# Patient Record
Sex: FEMALE | Race: WHITE | Employment: UNEMPLOYED | URBAN - METROPOLITAN AREA
[De-identification: names, ages, dates, MRNs, and addresses within clinical notes are randomized per-mention and may not be internally consistent; named-entity substitution may affect disease eponyms.]

---

## 2017-09-12 PROBLEM — Z85.3 PERSONAL HISTORY OF BREAST CANCER: Status: ACTIVE | Noted: 2017-09-12

## 2018-05-17 DIAGNOSIS — Z12.31 VISIT FOR SCREENING MAMMOGRAM: Primary | ICD-10-CM

## 2018-06-15 ASSESSMENT — ENCOUNTER SYMPTOMS
WHEEZING: 0
DIARRHEA: 0
EYE ITCHING: 0
VOMITING: 0
TROUBLE SWALLOWING: 0
SHORTNESS OF BREATH: 0
CONSTIPATION: 0
ROS SKIN COMMENTS: DENIES BREAST SKIN CHANGES, ARM SWELLING, OR PALPABLE AXILLARY OR SUPRACLAVICULAR ADENOPATHY.
BLOOD IN STOOL: 0
SORE THROAT: 0
SINUS PRESSURE: 0
ABDOMINAL PAIN: 0
EYE DISCHARGE: 0
SINUS PAIN: 0
RHINORRHEA: 0
COLOR CHANGE: 0
COUGH: 0
BACK PAIN: 0
CHEST TIGHTNESS: 0
VOICE CHANGE: 0
CHOKING: 0
ABDOMINAL DISTENTION: 0
NAUSEA: 0

## 2018-06-21 ENCOUNTER — HOSPITAL ENCOUNTER (OUTPATIENT)
Dept: GENERAL RADIOLOGY | Age: 68
Discharge: HOME | End: 2018-06-23
Payer: MEDICARE

## 2018-06-21 ENCOUNTER — TELEPHONE (OUTPATIENT)
Dept: BREAST CENTER | Age: 68
End: 2018-06-21

## 2018-06-21 ENCOUNTER — OFFICE VISIT (OUTPATIENT)
Dept: BREAST CENTER | Age: 68
End: 2018-06-21
Payer: MEDICARE

## 2018-06-21 VITALS
HEIGHT: 64 IN | RESPIRATION RATE: 16 BRPM | DIASTOLIC BLOOD PRESSURE: 64 MMHG | OXYGEN SATURATION: 99 % | WEIGHT: 116.3 LBS | BODY MASS INDEX: 19.85 KG/M2 | HEART RATE: 62 BPM | SYSTOLIC BLOOD PRESSURE: 110 MMHG | TEMPERATURE: 97.8 F

## 2018-06-21 DIAGNOSIS — Z12.31 VISIT FOR SCREENING MAMMOGRAM: ICD-10-CM

## 2018-06-21 DIAGNOSIS — Z85.3 PERSONAL HISTORY OF BREAST CANCER: ICD-10-CM

## 2018-06-21 DIAGNOSIS — Z12.31 ENCOUNTER FOR SCREENING MAMMOGRAM FOR MALIGNANT NEOPLASM OF BREAST: ICD-10-CM

## 2018-06-21 PROCEDURE — G8400 PT W/DXA NO RESULTS DOC: HCPCS | Performed by: NURSE PRACTITIONER

## 2018-06-21 PROCEDURE — 4004F PT TOBACCO SCREEN RCVD TLK: CPT | Performed by: NURSE PRACTITIONER

## 2018-06-21 PROCEDURE — 99213 OFFICE O/P EST LOW 20 MIN: CPT | Performed by: NURSE PRACTITIONER

## 2018-06-21 PROCEDURE — 4040F PNEUMOC VAC/ADMIN/RCVD: CPT | Performed by: NURSE PRACTITIONER

## 2018-06-21 PROCEDURE — 1090F PRES/ABSN URINE INCON ASSESS: CPT | Performed by: NURSE PRACTITIONER

## 2018-06-21 PROCEDURE — 77067 SCR MAMMO BI INCL CAD: CPT

## 2018-06-21 PROCEDURE — 3017F COLORECTAL CA SCREEN DOC REV: CPT | Performed by: NURSE PRACTITIONER

## 2018-06-21 PROCEDURE — 1123F ACP DISCUSS/DSCN MKR DOCD: CPT | Performed by: NURSE PRACTITIONER

## 2018-06-21 PROCEDURE — G8420 CALC BMI NORM PARAMETERS: HCPCS | Performed by: NURSE PRACTITIONER

## 2018-06-21 PROCEDURE — G8427 DOCREV CUR MEDS BY ELIG CLIN: HCPCS | Performed by: NURSE PRACTITIONER

## 2018-06-21 PROCEDURE — 3014F SCREEN MAMMO DOC REV: CPT | Performed by: NURSE PRACTITIONER

## 2019-06-14 ASSESSMENT — ENCOUNTER SYMPTOMS
SINUS PRESSURE: 0
RHINORRHEA: 0
BACK PAIN: 0
SINUS PAIN: 0
CONSTIPATION: 0
ABDOMINAL PAIN: 0
TROUBLE SWALLOWING: 0
CHOKING: 0
NAUSEA: 0
SHORTNESS OF BREATH: 0
BLOOD IN STOOL: 0
VOMITING: 0
VOICE CHANGE: 0
CHEST TIGHTNESS: 0
EYE ITCHING: 0
ABDOMINAL DISTENTION: 0
WHEEZING: 0
COLOR CHANGE: 0
COUGH: 0
DIARRHEA: 0
SORE THROAT: 0
EYE DISCHARGE: 0
ROS SKIN COMMENTS: DENIES BREAST SKIN CHANGES, ARM SWELLING, OR PALPABLE AXILLARY OR SUPRACLAVICULAR ADENOPATHY.

## 2019-06-14 NOTE — PROGRESS NOTES
Subjective:  Hx of left modified radical mastectomy for stage 0 or at most stage I breast cancer at age 30. No adjuvant therapy, (Endocrine, medical, or radiation). Patient ID: Merna Helton is a 81 y.o. female. HPI    History and Physical    Patient's Name/Date of Birth: Merna Helton / 1950    Merna Helton for follow-up in light of her personal history of breast cancer. PCP:  Ren Jay Gynecologist: Herson Jeong     The patient has not noted any right breast masses or changes in her left mastectomy site. Patient does not routinely do self breast exams. Patient denies nipple discharge. Patient has a history of left breast cancer, see notes below. Patient states she did not follow up with a medical oncologist due to circumstances. Breast cancer risk factors include age, gender, family history of breast cancer, previous breast CA. Age of menarche was 17. Age of menopause was 41. Patient denies hormonal therapy. Patient is . A    Estimated body mass index is 20.23 kg/(m^2) as calculated from the following:    Height as of this encounter: 5' 4.5\" (1.638 m). Weight as of this encounter: 119 lb 11.2 oz (54.3 kg). Bra Size: 32A-B    Patient drinks moderate caffeinated beverages. She does smoke cigarettes. History of left breast cancer:              Surgical pathology, 1988      Patient does not recall breast marker studies being done. 92: Right breast lump - Fine needle aspiration biopsy: scant cellular nondiagnostic aspirate insufficient for further evaluation  10/2/92: Excision of right breast mass ~ Pathology: Dense fibrosis and cystic dilation of ductal structures, some of which contain apocrine metaplasia. No evidence of atypia, hyperplasia, malignancy or other pathologic processes are identified. The findings are consistent with fibrocystic change.       2005:  Left implant removal due to discomfort:  Left axillary lymph node metoprolol succinate (TOPROL XL) 25 MG extended release tablet Take 25 mg by mouth daily       No current facility-administered medications for this visit. Allergies   Allergen Reactions    Celebrex [Celecoxib] Swelling       Family History   Problem Relation Age of Onset    Cancer Father 79        prostate    Heart Disease Father     Cancer Paternal Cousin 61        breast       Social History     Socioeconomic History    Marital status:      Spouse name: Not on file    Number of children: Not on file    Years of education: Not on file    Highest education level: Not on file   Occupational History    Not on file   Social Needs    Financial resource strain: Not on file    Food insecurity:     Worry: Not on file     Inability: Not on file    Transportation needs:     Medical: Not on file     Non-medical: Not on file   Tobacco Use    Smoking status: Light Tobacco Smoker     Packs/day: 0.25     Years: 40.00     Pack years: 10.00    Smokeless tobacco: Never Used    Tobacco comment: patient smokes a few cigarettes a day.   Prior heavy smoker of 1 PPD   Substance and Sexual Activity    Alcohol use: No    Drug use: No    Sexual activity: Not on file   Lifestyle    Physical activity:     Days per week: Not on file     Minutes per session: Not on file    Stress: Not on file   Relationships    Social connections:     Talks on phone: Not on file     Gets together: Not on file     Attends Taoist service: Not on file     Active member of club or organization: Not on file     Attends meetings of clubs or organizations: Not on file     Relationship status: Not on file    Intimate partner violence:     Fear of current or ex partner: Not on file     Emotionally abused: Not on file     Physically abused: Not on file     Forced sexual activity: Not on file   Other Topics Concern    Not on file   Social History Narrative    Not on file       Occupation: Works for [employer] Constitutional: Negative for activity change, appetite change, chills, fatigue, fever and unexpected weight change. HENT: Negative for congestion, postnasal drip, rhinorrhea, sinus pressure, sinus pain, sore throat, trouble swallowing and voice change. Eyes: Negative for discharge, itching and visual disturbance. Respiratory: Negative for cough, choking, chest tightness, shortness of breath and wheezing. Cardiovascular: Negative for chest pain, palpitations and leg swelling. Gastrointestinal: Negative for abdominal distention, abdominal pain, blood in stool, constipation, diarrhea, nausea and vomiting. Endocrine: Negative for cold intolerance and heat intolerance. Genitourinary: Negative for difficulty urinating, dysuria, frequency and hematuria. Musculoskeletal: Negative for arthralgias, back pain, gait problem, joint swelling, myalgias, neck pain and neck stiffness. Skin: Negative for color change, pallor and rash. Denies breast skin changes, arm swelling, or palpable axillary or supraclavicular adenopathy. Allergic/Immunologic: Negative for environmental allergies and food allergies. Neurological: Negative for dizziness, seizures, syncope, speech difficulty, weakness, light-headedness and headaches. Hematological: Negative for adenopathy. Does not bruise/bleed easily. Psychiatric/Behavioral: Negative for agitation, confusion and decreased concentration. The patient is not nervous/anxious. Denies issues with mood, cognition, anxiety, or depression. Patient denies previous history of DVT/PE. Objective:   Physical Exam   Constitutional: She is oriented to person, place, and time. She appears well-developed and well-nourished. No distress. ECOG 0   HENT:   Head: Normocephalic and atraumatic. Mouth/Throat: Oropharynx is clear and moist. No oropharyngeal exudate. Eyes: Pupils are equal, round, and reactive to light.  Conjunctivae and EOM are normal. Right eye exhibits no discharge. Left eye exhibits no discharge. No scleral icterus. Neck: Normal range of motion. Neck supple. No JVD present. No tracheal deviation present. No thyromegaly present. Cardiovascular: Normal rate, regular rhythm and normal heart sounds. Exam reveals no gallop and no friction rub. No murmur heard. Pulmonary/Chest: Effort normal and breath sounds normal. No stridor. No respiratory distress. She has no wheezes. She has no rales. She exhibits no mass, no tenderness, no bony tenderness, no laceration, no edema, no deformity, no swelling and no retraction. Right breast exhibits no inverted nipple, no mass, no nipple discharge, no skin change and no tenderness. Left breast exhibits no inverted nipple, no mass, no nipple discharge, no skin change and no tenderness. Breasts are asymmetrical.   Right breast with scattered benign feeling fibroglandular nodules. No suspicious lumps nodules or masses appreciated. No nipple discharge. No axillary adenopathy. Left chest wall scar intact. No nodules lumps or masses appreciated no axillary adenopathy. No evidence of recurrent disease         Abdominal: Soft. She exhibits no distension. There is no tenderness. There is no rebound and no guarding. Musculoskeletal: Normal range of motion. She exhibits no edema, tenderness or deformity. Right shoulder: Normal.        Left shoulder: Normal. She exhibits normal range of motion. Left elbow: She exhibits no swelling. Left wrist: She exhibits no swelling. Lymphadenopathy:     She has no cervical adenopathy. Right cervical: No superficial cervical, no deep cervical and no posterior cervical adenopathy present. Left cervical: No superficial cervical, no deep cervical and no posterior cervical adenopathy present. She has no axillary adenopathy. Right axillary: No pectoral and no lateral adenopathy present.         Left axillary: No pectoral and no lateral adenopathy present. Right: No supraclavicular adenopathy present. Left: No supraclavicular adenopathy present. Neurological: She is alert and oriented to person, place, and time. Coordination normal.   Skin: Skin is warm and dry. No rash noted. She is not diaphoretic. No erythema. No pallor. Psychiatric: She has a normal mood and affect. Her behavior is normal. Judgment and thought content normal.        Assessment:      81 y.o. woman who, with review of old records, it appears the patient had a left modified radical mastectomy for stage 0 or at most stage I breast cancer at age 30. Never had any adjuvant therapy, medical or radiation. Presented 9/12/2017 to establish care. Left breast cancer:  Post left modified radical mastectomy with axillary node dissection (24) and left breast mammoplasty on 11/18/1988:            Surgical pathology, 11/21/1988      Patient does not recall breast marker studies being done. 9/29/92: Right breast lump - Fine needle aspiration biopsy: scant cellular nondiagnostic aspirate insufficient for further evaluation  10/2/92: Excision of right breast mass ~ Pathology: Dense fibrosis and cystic dilation of ductal structures, some of which contain apocrine metaplasia. No evidence of atypia, hyperplasia, malignancy or other pathologic processes are identified. The findings are consistent with fibrocystic change.       1/19/2005:  Left implant removal due to discomfort:  Left axillary lymph node excision due to lump:          No adjuvant therapy, medical or radiation.     -Right screening mammogram today, 06/25/2019:    COMPARISON:   The present examination has been compared to prior imaging studies performed at [address] [place] [address] on 06/14/2017 and 06/21/2018.       CAD:   This exam was reviewed using the [employer] Computer Aided Detection (CAD)       TISSUE DENSITY:   The breast is extremely dense (Type 4 density).       MAMMOGRAM FINDINGS:   Finding 1:   No suspicious masses, areas of suspicious architectural distortion, suspicious calcifications, or additional suspicious findings are identified.  There are no significant changes from the prior study.       IMPRESSION:   No mammographic evidence of malignancy.       Screening mammogram in 1 year is recommended. Clinically, there is no evidence of recurrence. Genetic testing: Underwent genetic testing at [facility] in [facility]. She reports no genetic mutations identified. We will call for report. Will plan on yearly mammograms and physical exams in the office. Lymphedema precautions, left arm, reviewed. Questions answered to patient satisfaction. Plan:      1. Continue monthly breast self examination. Bring any changes to your physician's attention. 2. Continue healthy diet and exercise routinely as tolerated. 3. Avoid alcohol or limit alcohol intake to < 1 drink per day. 4. Repeat mammogram 1 year/June 2020. .  5. Continue follow up with Primary Care. 6. Labs per PCP  7. RTC 1 year with MGM same day (not ordered).

## 2019-06-25 ENCOUNTER — HOSPITAL ENCOUNTER (OUTPATIENT)
Dept: GENERAL RADIOLOGY | Age: 69
Discharge: HOME | End: 2019-06-27
Payer: MEDICARE

## 2019-06-25 ENCOUNTER — OFFICE VISIT (OUTPATIENT)
Dept: BREAST CENTER | Age: 69
End: 2019-06-25
Payer: MEDICARE

## 2019-06-25 VITALS
OXYGEN SATURATION: 98 % | SYSTOLIC BLOOD PRESSURE: 102 MMHG | RESPIRATION RATE: 16 BRPM | TEMPERATURE: 97.8 F | BODY MASS INDEX: 20.01 KG/M2 | WEIGHT: 117.2 LBS | HEIGHT: 64 IN | HEART RATE: 56 BPM | DIASTOLIC BLOOD PRESSURE: 68 MMHG

## 2019-06-25 DIAGNOSIS — Z12.31 ENCOUNTER FOR SCREENING MAMMOGRAM FOR MALIGNANT NEOPLASM OF BREAST: ICD-10-CM

## 2019-06-25 DIAGNOSIS — Z85.3 PERSONAL HISTORY OF BREAST CANCER: ICD-10-CM

## 2019-06-25 DIAGNOSIS — Z85.3 PERSONAL HISTORY OF BREAST CANCER: Primary | ICD-10-CM

## 2019-06-25 PROCEDURE — 4040F PNEUMOC VAC/ADMIN/RCVD: CPT | Performed by: SURGERY

## 2019-06-25 PROCEDURE — G8420 CALC BMI NORM PARAMETERS: HCPCS | Performed by: SURGERY

## 2019-06-25 PROCEDURE — 4004F PT TOBACCO SCREEN RCVD TLK: CPT | Performed by: SURGERY

## 2019-06-25 PROCEDURE — 77063 BREAST TOMOSYNTHESIS BI: CPT

## 2019-06-25 PROCEDURE — G8400 PT W/DXA NO RESULTS DOC: HCPCS | Performed by: SURGERY

## 2019-06-25 PROCEDURE — 3017F COLORECTAL CA SCREEN DOC REV: CPT | Performed by: SURGERY

## 2019-06-25 PROCEDURE — 99214 OFFICE O/P EST MOD 30 MIN: CPT | Performed by: SURGERY

## 2019-06-25 PROCEDURE — 1123F ACP DISCUSS/DSCN MKR DOCD: CPT | Performed by: SURGERY

## 2019-06-25 PROCEDURE — 99213 OFFICE O/P EST LOW 20 MIN: CPT | Performed by: SURGERY

## 2019-06-25 PROCEDURE — 1090F PRES/ABSN URINE INCON ASSESS: CPT | Performed by: SURGERY

## 2019-06-25 PROCEDURE — G8427 DOCREV CUR MEDS BY ELIG CLIN: HCPCS | Performed by: SURGERY

## 2019-06-25 RX ORDER — PHENOL 1.4 %
1 AEROSOL, SPRAY (ML) MUCOUS MEMBRANE DAILY
COMMUNITY

## 2019-06-25 NOTE — COMMUNICATION BODY
Assessment:     81 y.o. woman who, with review of old records, it appears the patient had a left modified radical mastectomy for stage 0 or at most stage I breast cancer at age 30. Never had any adjuvant therapy, medical or radiation. Presented 9/12/2017 to establish care. Left breast cancer:  Post left modified radical mastectomy with axillary node dissection (24) and left breast mammoplasty on 11/18/1988:            Surgical pathology, 11/21/1988      Patient does not recall breast marker studies being done. 9/29/92: Right breast lump - Fine needle aspiration biopsy: scant cellular nondiagnostic aspirate insufficient for further evaluation  10/2/92: Excision of right breast mass ~ Pathology: Dense fibrosis and cystic dilation of ductal structures, some of which contain apocrine metaplasia. No evidence of atypia, hyperplasia, malignancy or other pathologic processes are identified. The findings are consistent with fibrocystic change. 1/19/2005:  Left implant removal due to discomfort:  Left axillary lymph node excision due to lump:          No adjuvant therapy, medical or radiation.     -Right screening mammogram today, 06/25/2019:    COMPARISON:   The present examination has been compared to prior imaging studies performed at [address] [place] [address] on 06/14/2017 and 06/21/2018.       CAD:   This exam was reviewed using the [employer] Computer Aided Detection (CAD)       TISSUE DENSITY:   The breast is extremely dense (Type 4 density).       MAMMOGRAM FINDINGS:   Finding 1:   No suspicious masses, areas of suspicious architectural distortion, suspicious calcifications, or additional suspicious findings are identified.  There are no significant changes from the prior study.       IMPRESSION:   No mammographic evidence of malignancy.       Screening mammogram in 1 year is recommended. Clinically, there is no evidence of recurrence.     Genetic testing: Underwent genetic testing at [facility] in [facility]. She reports no genetic mutations identified. We will call for report. Will plan on yearly mammograms and physical exams in the office. Lymphedema precautions, left arm, reviewed. Questions answered to patient satisfaction. Plan:     1. Continue monthly breast self examination. Bring any changes to your physician's attention. 2. Continue healthy diet and exercise routinely as tolerated. 3. Avoid alcohol or limit alcohol intake to < 1 drink per day. 4. Repeat mammogram 1 year/June 2020. .  5. Continue follow up with Primary Care. 6. Labs per PCP  7. RTC 1 year with MGM same day (not ordered).

## 2019-06-25 NOTE — LETTER
[facility]  [place] [address]  Phone: [phone]  Fax: [phone]    Adeline Officer, MD        June 25, 2019     Star Martel MD  [address] [place] [address] [phone] [id number]    Patient: Jennifer Matias  MR Number: <[id number]>  YOB: 1950  Date of Visit: 6/25/2019    Dear Dr. Star Martel:     Jennifer Matias stopped in to see us today in follow-up of her left breast cancer. Below are the relevant portions of my assessment and plan of care. Assessment:     81 y.o. woman whounderwent a left modified radical mastectomy for stage 0 or at most stage I breast cancer at age 30. No adjuvant therapy, medical or radiation. Left breast cancer:  Post left modified radical mastectomy with axillary node dissection (24) and left breast mammoplasty on 11/18/1988:            Surgical pathology, 11/21/1988      Patient does not recall breast marker studies being done. 9/29/92: Right breast lump - Fine needle aspiration biopsy: scant cellular nondiagnostic aspirate insufficient for further evaluation  10/2/92: Excision of right breast mass ~ Pathology: Dense fibrosis and cystic dilation of ductal structures, some of which contain apocrine metaplasia. No evidence of atypia, hyperplasia, malignancy or other pathologic processes are identified. The findings are consistent with fibrocystic change.     1/19/2005:  Left implant removal due to discomfort:  Left axillary lymph node excision due to lump:          No adjuvant therapy, medical or radiation.     -Right screening mammogram today, 06/25/2019:    COMPARISON:   The present examination has been compared to prior imaging studies performed at [address] [place] [address] on 06/14/2017 and 06/21/2018.       CAD:   This exam was reviewed using the [employer] Computer Aided Detection (CAD)       TISSUE DENSITY:   The breast is extremely dense (Type 4 density).       MAMMOGRAM FINDINGS:

## 2019-06-25 NOTE — PATIENT INSTRUCTIONS
Patient is scheduled for mammogram 6/30/20 @ 9:00am Breast care center --- Follow up visit with Pete Nguyen CNP following imaging 6/30/20 @ 10:00am

## 2019-06-26 ENCOUNTER — TELEPHONE (OUTPATIENT)
Dept: BREAST CENTER | Age: 69
End: 2019-06-26

## 2019-06-26 DIAGNOSIS — Z12.31 SCREENING MAMMOGRAM, ENCOUNTER FOR: Primary | ICD-10-CM

## 2019-06-26 DIAGNOSIS — R92.8 ABNORMAL SCREENING MAMMOGRAM: ICD-10-CM

## 2019-06-26 NOTE — TELEPHONE ENCOUNTER
Spoke with staff at [facility]. Requested patient's genetics report. She was referred June 2018. Fax and phone number provided.

## 2020-06-25 ASSESSMENT — ENCOUNTER SYMPTOMS
SINUS PRESSURE: 0
ABDOMINAL DISTENTION: 0
CHOKING: 0
COUGH: 0
CHEST TIGHTNESS: 0
DIARRHEA: 0
RHINORRHEA: 0
ABDOMINAL PAIN: 0
BLOOD IN STOOL: 0
VOICE CHANGE: 0
EYE ITCHING: 0
SINUS PAIN: 0
COLOR CHANGE: 0
CONSTIPATION: 0
EYE DISCHARGE: 0
ROS SKIN COMMENTS: DENIES BREAST SKIN CHANGES, ARM SWELLING, OR PALPABLE AXILLARY OR SUPRACLAVICULAR ADENOPATHY.
SORE THROAT: 0
VOMITING: 0
TROUBLE SWALLOWING: 0
SHORTNESS OF BREATH: 0
NAUSEA: 0
BACK PAIN: 0
WHEEZING: 0

## 2020-06-25 NOTE — PROGRESS NOTES
Subjective:  Hx of left modified radical mastectomy for stage 0 or at most stage I breast cancer at age 30. No adjuvant therapy, (Endocrine, medical, or radiation). Some elements of all sections below were copied from my previous note were reviewed, re-examined and updated where appropriate. All elements reflect the medical decision making of today. Patient ID: Asiya Morrell is a 78 y.o. female. HPI  History and Physical    Patient's Name/Date of Birth: Asiya Morrell / 1950    Asiya Morrell for follow-up in light of her personal history of breast cancer. PCP:  Kadeem Mullins Gynecologist: Walt Almazan     78 y.o. pleasant female presents for follow up today and is without breast/chest wall related complaints. She does have bilateral shoulder discomfort and is currently in therapy. Patient denies nipple discharge. Patient has a history of left breast cancer, see notes below. Patient states she did not follow up with a medical oncologist due to circumstances. Breast cancer risk factors include age, gender, family history of breast cancer, previous breast CA. Age of menarche was 17. Age of menopause was 41. Patient denies hormonal therapy. Patient is . A    Estimated body mass index is 20.23 kg/(m^2) as calculated from the following:    Height as of this encounter: 5' 4.5\" (1.638 m). Weight as of this encounter: 119 lb 11.2 oz (54.3 kg). Bra Size: 32A-B    Patient drinks moderate caffeinated beverages. She does smoke cigarettes. History of left breast cancer:        Surgical pathology, 1988      Patient does not recall breast marker studies being done. 92: Right breast lump - Fine needle aspiration biopsy: scant cellular nondiagnostic aspirate insufficient for further evaluation    10/2/92: Excision of right breast mass ~ Pathology: Dense fibrosis and cystic dilation of ductal structures, some of which contain apocrine metaplasia.  No evidence of

## 2020-06-30 ENCOUNTER — HOSPITAL ENCOUNTER (OUTPATIENT)
Dept: GENERAL RADIOLOGY | Age: 70
Discharge: HOME | End: 2020-07-02
Payer: MEDICARE

## 2020-06-30 ENCOUNTER — OFFICE VISIT (OUTPATIENT)
Dept: BREAST CENTER | Age: 70
End: 2020-06-30
Payer: MEDICARE

## 2020-06-30 VITALS
RESPIRATION RATE: 18 BRPM | WEIGHT: 116 LBS | HEIGHT: 64 IN | HEART RATE: 54 BPM | BODY MASS INDEX: 19.81 KG/M2 | DIASTOLIC BLOOD PRESSURE: 64 MMHG | TEMPERATURE: 97.4 F | SYSTOLIC BLOOD PRESSURE: 112 MMHG | OXYGEN SATURATION: 97 %

## 2020-06-30 PROCEDURE — 4004F PT TOBACCO SCREEN RCVD TLK: CPT | Performed by: NURSE PRACTITIONER

## 2020-06-30 PROCEDURE — 99213 OFFICE O/P EST LOW 20 MIN: CPT | Performed by: NURSE PRACTITIONER

## 2020-06-30 PROCEDURE — 1090F PRES/ABSN URINE INCON ASSESS: CPT | Performed by: NURSE PRACTITIONER

## 2020-06-30 PROCEDURE — 3017F COLORECTAL CA SCREEN DOC REV: CPT | Performed by: NURSE PRACTITIONER

## 2020-06-30 PROCEDURE — G8420 CALC BMI NORM PARAMETERS: HCPCS | Performed by: NURSE PRACTITIONER

## 2020-06-30 PROCEDURE — 77063 BREAST TOMOSYNTHESIS BI: CPT

## 2020-06-30 PROCEDURE — G8400 PT W/DXA NO RESULTS DOC: HCPCS | Performed by: NURSE PRACTITIONER

## 2020-06-30 PROCEDURE — 4040F PNEUMOC VAC/ADMIN/RCVD: CPT | Performed by: NURSE PRACTITIONER

## 2020-06-30 PROCEDURE — G8427 DOCREV CUR MEDS BY ELIG CLIN: HCPCS | Performed by: NURSE PRACTITIONER

## 2020-06-30 PROCEDURE — 1123F ACP DISCUSS/DSCN MKR DOCD: CPT | Performed by: NURSE PRACTITIONER

## 2021-02-16 ENCOUNTER — OFFICE VISIT (OUTPATIENT)
Dept: ENT CLINIC | Age: 71
End: 2021-02-16
Payer: MEDICARE

## 2021-02-16 VITALS — WEIGHT: 119 LBS | HEIGHT: 64 IN | BODY MASS INDEX: 20.32 KG/M2

## 2021-02-16 DIAGNOSIS — J34.89 NASAL MUCOSA DRY: ICD-10-CM

## 2021-02-16 DIAGNOSIS — R04.0 EPISTAXIS: Primary | ICD-10-CM

## 2021-02-16 PROCEDURE — 1123F ACP DISCUSS/DSCN MKR DOCD: CPT | Performed by: OTOLARYNGOLOGY

## 2021-02-16 PROCEDURE — 4004F PT TOBACCO SCREEN RCVD TLK: CPT | Performed by: OTOLARYNGOLOGY

## 2021-02-16 PROCEDURE — 4040F PNEUMOC VAC/ADMIN/RCVD: CPT | Performed by: OTOLARYNGOLOGY

## 2021-02-16 PROCEDURE — G8484 FLU IMMUNIZE NO ADMIN: HCPCS | Performed by: OTOLARYNGOLOGY

## 2021-02-16 PROCEDURE — 99204 OFFICE O/P NEW MOD 45 MIN: CPT | Performed by: OTOLARYNGOLOGY

## 2021-02-16 PROCEDURE — 30901 CONTROL OF NOSEBLEED: CPT | Performed by: OTOLARYNGOLOGY

## 2021-02-16 PROCEDURE — G8400 PT W/DXA NO RESULTS DOC: HCPCS | Performed by: OTOLARYNGOLOGY

## 2021-02-16 PROCEDURE — 3017F COLORECTAL CA SCREEN DOC REV: CPT | Performed by: OTOLARYNGOLOGY

## 2021-02-16 PROCEDURE — G8420 CALC BMI NORM PARAMETERS: HCPCS | Performed by: OTOLARYNGOLOGY

## 2021-02-16 PROCEDURE — 1090F PRES/ABSN URINE INCON ASSESS: CPT | Performed by: OTOLARYNGOLOGY

## 2021-02-16 PROCEDURE — G8427 DOCREV CUR MEDS BY ELIG CLIN: HCPCS | Performed by: OTOLARYNGOLOGY

## 2021-02-16 RX ORDER — VITAMIN B COMPLEX
1 CAPSULE ORAL DAILY
COMMUNITY

## 2021-02-16 ASSESSMENT — ENCOUNTER SYMPTOMS
EYES NEGATIVE: 1
ALLERGIC/IMMUNOLOGIC NEGATIVE: 1
RESPIRATORY NEGATIVE: 1

## 2021-02-16 NOTE — PATIENT INSTRUCTIONS
Use nasal saline spray and AYR gel to nose few times a day      Patient Education        Nose Cautery for Nosebleeds: What to Expect at [address] [place] [address] cautery can help prevent nosebleeds. The doctor uses a chemical swab or an electric current to cauterize the inside of the nose. This seals the blood vessels and builds scar tissue to help prevent more bleeding. For this procedure, your doctor made the inside of your nose numb. After the procedure, you may feel itching and pain in your nose for 3 to 5 days. Over-the-counter pain medicines can help with pain. You may feel like you want to touch, scratch, or pick at the inside of your nose. But doing this may cause more nosebleeds. This care sheet gives you a general idea about how long it will take for you to recover. But each person recovers at a different pace. Follow the steps below to feel better as quickly as possible. How can you care for yourself at home? Nose care    · Don't touch the part of your nose that was treated.     · Try not to bump your nose.     · To avoid irritating your nose, do not blow your nose for 2 weeks. Gently wipe it one nostril at a time.     · If you get another nosebleed:  ? Sit up and tilt your head slightly forward. This keeps blood from going down your throat. ? Use your thumb and index finger to pinch your nose shut for 10 minutes. Use a clock. Do not check to see if the bleeding has stopped before the 10 minutes are up. If the bleeding has not stopped, pinch your nose shut for another 10 minutes.     · Apply antibacterial ointment or saline nasal spray to the inside of your nose several times a day for 10 days. This will help keep the area moist.   Activity    · For the first 2 to 3 hours after the procedure:  ? Don't bend over or lift anything heavy. ? Avoid heavy exercise or activity.     · You can do your normal activities when it feels okay to do so.    Medicines    · Your doctor will tell you if and when you can restart your medicines. He or she will also give you instructions about taking any new medicines.     · If you take aspirin or some other blood thinner, ask your doctor if and when to start taking it again. Make sure that you understand exactly what your doctor wants you to do.     · Be safe with medicines. Read and follow all instructions on the label. ? If the doctor gave you a prescription medicine for pain, take it as prescribed. ? If you are not taking a prescription pain medicine, ask your doctor if you can take an over-the-counter medicine. ? Avoid aspirin and NSAIDs like ibuprofen (Advil, Motrin) and naproxen (Aleve) while your nose is healing. They can increase the risk of bleeding. Follow-up care is a key part of your treatment and safety. Be sure to make and go to all appointments, and call your doctor if you are having problems. It's also a good idea to know your test results and keep a list of the medicines you take. When should you call for help? Call your doctor now or seek immediate medical care if:    · You have pain that does not get better after you take pain medicine.     · You get another nosebleed and your nose is still bleeding after you have pinched your nose shut 3 times for 10 minutes each time (30 minutes total).     · There is a lot of blood running down the back of your throat even after you pinch your nose and tilt your head forward.     · You have a fever. Watch closely for any changes in your health, and be sure to contact your doctor if:    · You still get nosebleeds often, even if they don't last long.     · You do not get better as expected. Where can you learn more? Go to https://Dynadmicmignon.[employer]. org and sign in to your [employer] account. Enter V667 in the [facility] box to learn more about \"Nose Cautery for Nosebleeds: What to Expect at Home. \"     If you do not have an account, please click on the \"Sign Up Now\" link.   Current as of: June 26, 4775               [employer] Version: 12.6  © 6191-3947 [employer]. Care instructions adapted under license by [facility] ([facility]). If you have questions about a medical condition or this instruction, always ask your healthcare professional. [address] any warranty or liability for your use of this information.

## 2021-02-16 NOTE — PROGRESS NOTES
Subjective:      Patient ID:  Sophia Atkinson is a 78 y.o. female. HPI:  Recurrent Epistaxis  The patient is a 78 y.o. female who presentes with epistaxis. The patient reports nosebleeds for a few months mainly on the right side, she reports only when she blows her nose she has bright red blood-tinged mucous. She has been wearing mask which she thinks she has been contributing to  The problem. She denies active anterior or posterior bleeding. The patient has never been treated for this before. Denies nasal congestion, nasal obstruction, fever, chills, weight changes. She has using Ocean spray intermittently. Chronic O2? no    There is not history of easy bruising or bleeding. Pt is not on anticoagulation therapy     Other epistaxis risk factors: dry air  Patient's medications, allergies, past medical,surgical, social and family histories were reviewed and updated as appropriate. Review of Systems   Constitutional: Negative. HENT: Positive for nosebleeds. Eyes: Negative. Respiratory: Negative. Allergic/Immunologic: Negative. Neurological: Negative. Hematological: Negative. Does not bruise/bleed easily. Psychiatric/Behavioral: Negative. All other systems reviewed and are negative. Objective: There were no vitals filed for this visit. Physical Exam  Vitals signs reviewed. Constitutional:       Appearance: Normal appearance. HENT:      Head: Normocephalic. Right Ear: Tympanic membrane, ear canal and external ear normal.      Left Ear: Tympanic membrane, ear canal and external ear normal.      Nose: Nose normal.      Comments: Dry nasal mucosa  Prominent vessels on the right anterior septum      Mouth/Throat:      Mouth: Mucous membranes are moist.      Pharynx: Oropharynx is clear. Uvula midline. Eyes:      Conjunctiva/sclera: Conjunctivae normal.   Neck:      Musculoskeletal: Normal range of motion and neck supple.    Cardiovascular:      Rate and Rhythm: Normal rate. Pulses: Normal pulses. Pulmonary:      Effort: Pulmonary effort is normal.   Lymphadenopathy:      Cervical: No cervical adenopathy. Skin:     Capillary Refill: Capillary refill takes less than 2 seconds. Neurological:      General: No focal deficit present. Mental Status: She is alert. Procedure - Nasal Cautery  The right side of the patient was found to have prominent septal vessels in the Anteriorportion of the septum. The nose was anesthetized with a mixture of lidocaine 4% and afrin nasal spray. The irritated area was thencauterized with a silver nitrate stick until a white frost covered the affected area and no bleeding was seen. The nose was packed with Fibrillar      Assessment:       Diagnosis Orders   1. Epistaxis  49772 - NE CTRL NOSEBLEED,ANTER,SIMPLE   2. Nasal mucosa dry                Plan:      silver nitrate cautery of vessels in the office. · Open Mouth and cover when sneezing, coughing  · No nose blowing for 2 weeks  · Nasal saline spray in each nostril 4-5 times a day    Follow up in 2 week(s)                       Mely Cooper  1950      I have discussed the case, including pertinent history and exam findings with the resident. I have seen and examined the patient and the key elements of the encounter have been performed by me. I agree with the assessment, plan and orders as documented by the resident. Patient here for follow up of medical problems. Remainder of medical problems as per resident note.       [facility], DO  3/8/21

## 2021-03-02 ENCOUNTER — OFFICE VISIT (OUTPATIENT)
Dept: ENT CLINIC | Age: 71
End: 2021-03-02
Payer: MEDICARE

## 2021-03-02 VITALS
HEART RATE: 69 BPM | WEIGHT: 119 LBS | DIASTOLIC BLOOD PRESSURE: 50 MMHG | SYSTOLIC BLOOD PRESSURE: 103 MMHG | BODY MASS INDEX: 19.83 KG/M2 | HEIGHT: 65 IN

## 2021-03-02 DIAGNOSIS — Z87.898 HISTORY OF EPISTAXIS: Primary | ICD-10-CM

## 2021-03-02 PROCEDURE — G8427 DOCREV CUR MEDS BY ELIG CLIN: HCPCS | Performed by: OTOLARYNGOLOGY

## 2021-03-02 PROCEDURE — 1123F ACP DISCUSS/DSCN MKR DOCD: CPT | Performed by: OTOLARYNGOLOGY

## 2021-03-02 PROCEDURE — G8420 CALC BMI NORM PARAMETERS: HCPCS | Performed by: OTOLARYNGOLOGY

## 2021-03-02 PROCEDURE — G8400 PT W/DXA NO RESULTS DOC: HCPCS | Performed by: OTOLARYNGOLOGY

## 2021-03-02 PROCEDURE — 99213 OFFICE O/P EST LOW 20 MIN: CPT | Performed by: OTOLARYNGOLOGY

## 2021-03-02 PROCEDURE — 4004F PT TOBACCO SCREEN RCVD TLK: CPT | Performed by: OTOLARYNGOLOGY

## 2021-03-02 PROCEDURE — G8484 FLU IMMUNIZE NO ADMIN: HCPCS | Performed by: OTOLARYNGOLOGY

## 2021-03-02 PROCEDURE — 4040F PNEUMOC VAC/ADMIN/RCVD: CPT | Performed by: OTOLARYNGOLOGY

## 2021-03-02 PROCEDURE — 1090F PRES/ABSN URINE INCON ASSESS: CPT | Performed by: OTOLARYNGOLOGY

## 2021-03-02 PROCEDURE — 3017F COLORECTAL CA SCREEN DOC REV: CPT | Performed by: OTOLARYNGOLOGY

## 2021-03-02 ASSESSMENT — ENCOUNTER SYMPTOMS
EYES NEGATIVE: 1
RESPIRATORY NEGATIVE: 1
ALLERGIC/IMMUNOLOGIC NEGATIVE: 1

## 2021-03-02 NOTE — PROGRESS NOTES
Subjective:      Patient ID:  Helen Dash is a 78 y.o. female. HPI: Patient presents today for two week follow up status post cauterization for recurrent epistaxis. She has been doing well overall. States she noticed minimal bleeding for one week when she blew her nose, however no active or profuse bleeding episodes occurred. She has been using the AYR gel everyday and this seems to be controlling her symptoms. 2/16/21: The patient is a 78 y.o. female who presentes with epistaxis. The patient reports nosebleeds for a few months mainly on the right side, she reports only when she blows her nose she has bright red blood-tinged mucous. She has been wearing mask which she thinks she has been contributing to  The problem. She denies active anterior or posterior bleeding. The patient has never been treated for this before. Denies nasal congestion, nasal obstruction, fever, chills, weight changes. She has using Ocean spray intermittently. Chronic O2? no    There is not history of easy bruising or bleeding. Pt is not on anticoagulation therapy     Other epistaxis risk factors: dry air  Patient's medications, allergies, past medical,surgical, social and family histories were reviewed and updated as appropriate. Review of Systems   Constitutional: Negative. Negative for chills and fever. HENT: Negative for nosebleeds and postnasal drip. Eyes: Negative. Respiratory: Negative. Allergic/Immunologic: Negative. Neurological: Negative. Hematological: Negative. Does not bruise/bleed easily. Psychiatric/Behavioral: Negative. All other systems reviewed and are negative. Objective:     Vitals:    03/02/21 0759   BP: (!) 103/50   Pulse: 69       Physical Exam  Vitals signs reviewed. Constitutional:       Appearance: Normal appearance. HENT:      Head: Normocephalic.       Right Ear: Tympanic membrane, ear canal and external ear normal.      Left Ear: Tympanic membrane, ear canal and external ear normal.      Nose: Nose normal.      Comments: Healing mucosa s/p cauterization on right side  No prominent vessels seen       Mouth/Throat:      Mouth: Mucous membranes are moist.      Pharynx: Oropharynx is clear. Uvula midline. Eyes:      Conjunctiva/sclera: Conjunctivae normal.   Neck:      Musculoskeletal: Normal range of motion and neck supple. Cardiovascular:      Rate and Rhythm: Normal rate. Pulses: Normal pulses. Pulmonary:      Effort: Pulmonary effort is normal.   Lymphadenopathy:      Cervical: No cervical adenopathy. Skin:     Capillary Refill: Capillary refill takes less than 2 seconds. Neurological:      General: No focal deficit present. Mental Status: She is alert. Assessment:       Diagnosis Orders   1. History of epistaxis                Plan:      S/p silver nitrate cautery of vessels in the office two weeks ago  · Continue Nasal saline spray/AYR gel in each nostril 4-5 times a day  · Recommend humidifier at home  · Gentle nose blowing  · Bacitracin to septum   · Follow up as needed for any further bleeding issues    Electronically signed by Kostas Lowry DO on 3/2/2021 at [address]  1950      I have discussed the case, including pertinent history and exam findings with the resident. I have seen and examined the patient and the key elements of the encounter have been performed by me. I agree with the assessment, plan and orders as documented by the resident. Patient here for follow up of medical problems. Remainder of medical problems as per resident note.       Naomy Suárez DO  3/18/21    '

## 2021-06-17 ASSESSMENT — ENCOUNTER SYMPTOMS
ROS SKIN COMMENTS: DENIES BREAST SKIN CHANGES, ARM SWELLING, OR PALPABLE AXILLARY OR SUPRACLAVICULAR ADENOPATHY.
BLOOD IN STOOL: 0
TROUBLE SWALLOWING: 0
COLOR CHANGE: 0
COUGH: 0
ABDOMINAL PAIN: 0
SINUS PAIN: 0
RHINORRHEA: 0
ABDOMINAL DISTENTION: 0
VOICE CHANGE: 0
EYE ITCHING: 0
CONSTIPATION: 0
DIARRHEA: 0
CHEST TIGHTNESS: 0
NAUSEA: 0
BACK PAIN: 0
EYE DISCHARGE: 0
VOMITING: 0
WHEEZING: 0
CHOKING: 0
SHORTNESS OF BREATH: 0
SORE THROAT: 0
SINUS PRESSURE: 0

## 2021-06-17 NOTE — PROGRESS NOTES
Subjective:  Hx of left modified radical mastectomy for stage 0 or at most stage I breast cancer at age 30. No adjuvant therapy, (Endocrine, medical, or radiation). Patient ID: Israel Casanova is a 73 y.o. female. This note was copied forward from the last encounter. Essential components for this patient record were reviewed and verified on this visit including:  recent hospitalizations, recent imaging, PMH, PSH, FH, SOC HX, Allergies, and Medications were reviewed and updated as appropriate. In addition, the assessment and plan were copied from prior office note and updated accordingly. HPI  History and Physical    Patient's Name/Date of Birth: Israel Casanova / 1950    Israel Casanova for follow-up    PCP:  Louis Woo Gynecologist: Joselito Card     73 y.o. pleasant female presents for follow up today for her early breast cancer. History of left breast cancer:        Surgical pathology, 11/21/1988      Patient does not recall breast marker studies being done. 9/29/92: Right breast lump - Fine needle aspiration biopsy: scant cellular nondiagnostic aspirate insufficient for further evaluation    10/2/92: Excision of right breast mass ~ Pathology: Dense fibrosis and cystic dilation of ductal structures, some of which contain apocrine metaplasia. No evidence of atypia, hyperplasia, malignancy or other pathologic processes are identified. The findings are consistent with fibrocystic change. 1/19/2005:  Left implant removal due to discomfort:  Left axillary lymph node excision due to lump:      -06/14/17, Right screening mammogram, [facility]:  Negative, BI-RADS 1  -06/25/19 [facility] Imaging: No mammographic evidence of malignancy.     Past Medical History:   Diagnosis Date    Cancer [facility]) 1988    left breast cancer    Vasovagal syndrome     neurocardiogenic syncope       Past Surgical History:   Procedure Laterality Date    BREAST BIOPSY Right 1992    BREAST RECONSTRUCTION Left 1988       Current Outpatient Medications   Medication Sig Dispense Refill    b complex vitamins capsule Take 1 capsule by mouth daily      vitamin D (CHOLECALCIFEROL) 25 MCG (1000 UT) TABS tablet Take 1,000 Units by mouth daily      BL EVENING PRIMROSE OIL PO Take by mouth      Saline 0.2 % SOLN by Nasal route      calcium carbonate (CALCIUM 600) 600 MG TABS tablet Take 1 tablet by mouth daily      Magnesium 400 MG CAPS Take 400 mg by mouth daily      metoprolol succinate (TOPROL XL) 25 MG extended release tablet Take 25 mg by mouth daily       No current facility-administered medications for this visit. Allergies   Allergen Reactions    Celebrex [Celecoxib] Swelling       Family History   Problem Relation Age of Onset    Cancer Father 79        prostate    Heart Disease Father     Cancer Paternal Cousin 61        breast       Social History     Socioeconomic History    Marital status:      Spouse name: Not on file    Number of children: Not on file    Years of education: Not on file    Highest education level: Not on file   Occupational History    Not on file   Tobacco Use    Smoking status: Light Tobacco Smoker     Packs/day: 0.25     Years: 40.00     Pack years: 10.00    Smokeless tobacco: Never Used    Tobacco comment: patient smokes a few cigarettes a day. Prior heavy smoker of 1 PPD   Substance and Sexual Activity    Alcohol use: No    Drug use: No    Sexual activity: Not on file   Other Topics Concern    Not on file   Social History Narrative    Not on file     Social Determinants of Health     Financial Resource Strain:     Difficulty of Paying Living Expenses:    Food Insecurity:     Worried About Running Out of Food in the Last Year:     [address] in the Last Year:    Transportation Needs:     Lack of Transportation (Medical):      Lack of Transportation (Non-Medical):    Physical Activity:     Days of Exercise per Week:     Minutes of Exercise per Session:    Stress:     Feeling of Stress :    Social Connections:     Frequency of Communication with Friends and Family:     Frequency of Social Gatherings with Friends and Family:     Attends Denominational Services:     Active Member of Clubs or Organizations:     Attends Club or Organization Meetings:     Marital Status:    Intimate Partner Violence:     Fear of Current or Ex-Partner:     Emotionally Abused:     Physically Abused:     Sexually Abused:        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 Continues to feel well. 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 HENT: Negative for congestion, postnasal drip, rhinorrhea, sinus pressure, sinus pain, sore throat, trouble swallowing and voice change. Eyes: Negative for discharge, itching and visual disturbance. Respiratory: Negative for cough, choking, chest tightness, shortness of breath and wheezing. Still smoking 4-5 cigarettes per day. Cardiovascular: Negative for chest pain, palpitations and leg swelling. Gastrointestinal: Negative for abdominal distention, abdominal pain, blood in stool, constipation, diarrhea, nausea and vomiting. Endocrine: Negative for cold intolerance and heat intolerance. Genitourinary: Negative for difficulty urinating, dysuria, frequency and hematuria. Musculoskeletal: Negative for arthralgias, back pain, gait problem, joint swelling, myalgias, neck pain and neck stiffness. Hx frozen right shoulder; discomfort improving of the left shoulder secondary to extensive physical therapy. Skin: Negative for color change, pallor and rash. Denies breast skin changes, arm swelling, or palpable axillary or supraclavicular adenopathy. Allergic/Immunologic: Negative for environmental allergies and food allergies.    Neurological: Negative for dizziness, seizures, syncope, speech difficulty, weakness, light-headedness and headaches. Hematological: Negative for adenopathy. Does not bruise/bleed easily. Psychiatric/Behavioral: Negative for agitation, confusion and decreased concentration. The patient is not nervous/anxious. Denies issues with mood, cognition, anxiety, or depression. Patient denies previous history of DVT/PE. Objective:   Physical Exam  Vitals and nursing note reviewed. Constitutional:       General: She is not in acute distress. Appearance: Normal appearance. She is well-developed. She is not diaphoretic. Comments: ECOG remains stable. pleasant and cooperative. HENT:      Head: Normocephalic and atraumatic. Mouth/Throat:      Pharynx: No oropharyngeal exudate. Eyes:      General: No scleral icterus. Right eye: No discharge. Left eye: No discharge. Conjunctiva/sclera: Conjunctivae normal.      Pupils: Pupils are equal, round, and reactive to light. Neck:      Thyroid: No thyromegaly. Vascular: No JVD. Trachea: No tracheal deviation. Cardiovascular:      Rate and Rhythm: Normal rate and regular rhythm. Heart sounds: Normal heart sounds. No murmur heard. No friction rub. No gallop. Pulmonary:      Effort: Pulmonary effort is normal. No respiratory distress or retractions. Breath sounds: Normal breath sounds. No stridor. No wheezing or rales. Chest:      Chest wall: No mass, lacerations, deformity, swelling, tenderness or edema. Breasts: Breasts are asymmetrical.         Right: No inverted nipple, mass, nipple discharge, skin change or tenderness. Left: No inverted nipple, mass, nipple discharge, skin change or tenderness. Comments: Right breast supple. Her breast exam remains stable with no nipple discharge. No skin changes. No clinically suspicious findings. No axillary adenopathy. Abdominal:      General: There is no distension. Palpations: Abdomen is soft. Tenderness: There is no abdominal tenderness. There is no guarding or rebound. Musculoskeletal:         General: No tenderness or deformity. Normal range of motion. Right shoulder: Normal.      Left shoulder: Normal. Normal range of motion. Left elbow: No swelling. Left wrist: No swelling. Cervical back: Normal range of motion and neck supple. Lymphadenopathy:      Cervical: No cervical adenopathy. Right cervical: No superficial, deep or posterior cervical adenopathy. Left cervical: No superficial, deep or posterior cervical adenopathy. Upper Body:      Right upper body: No supraclavicular or pectoral adenopathy. Left upper body: No supraclavicular or pectoral adenopathy. Skin:     General: Skin is warm and dry. Coloration: Skin is not pale. Findings: No erythema or rash. Neurological:      Mental Status: She is alert and oriented to person, place, and time. Coordination: Coordination normal.   Psychiatric:         Behavior: Behavior normal.         Thought Content: Thought content normal.         Judgment: Judgment normal.        Assessment:      73 y.o. woman who, with review of old records, it appears the patient had a left modified radical mastectomy for stage 0 or at most stage I breast cancer at age 30. Never had any adjuvant therapy, medical or radiation. Presented 9/12/2017 to establish care.     Left breast cancer:  Post left modified radical mastectomy with axillary node dissection (24) and left breast mammoplasty on 11/18/1988:            Surgical pathology, 11/21/1988      Patient does not recall breast marker studies being done.       -09/29/92: Right breast lump - Fine needle aspiration biopsy: scant cellular nondiagnostic aspirate insufficient for further evaluation  -10/2/92: Excision of right breast mass ~ Pathology: Dense fibrosis and cystic dilation of ductal structures, some of which contain apocrine metaplasia. No evidence of atypia, hyperplasia, malignancy or other pathologic processes are identified. The findings are consistent with fibrocystic change. 1/19/2005:  Left implant removal due to discomfort:  Left axillary lymph node excision due to lump:          No adjuvant therapy, medical or radiation.     -06/25/2019 right screening mammogram: Negative, BI-RADS 1.  -06/30/2020 right screening mammogram:  Negative. BI-RADS 1.  -07/01/2021 right screening mammogram:  Negative. BI-RADS 1.  -07/01/2021 clinical follow-up is without evidence of recurrent disease. She continues to work and feels well.       -02/20/2019 Genetic testing with [employer] 47 gene test assay: Normal, no mutations. Plan:   1. Continue monthly breast/chest wall self examination; detailed instructions reviewed today. Bring any changes to your physician's attention. 2. Continue healthy diet and exercise routinely as tolerated. 3. Avoid alcohol. 4. Limit caffeine intake. 5. Stop smoking encouraged. 6. Wear a good support bra at all times. 7. Repeat mammogram 1 year. 8. Continue follow up with Primary Care/Gynecology. 9. RTC 1 year with mammogram same day. During today's visit, face-to-face time 15 minutes, greater than 50% in counseling education and coordination of care. All questions were answered to her apparent satisfaction, and she is agreeable to the plan as outlined above. Rona Masterson, RN, MSN, APRN-CNP, [address] [place]  Advanced Oncology Certified Nurse Practitioner  Department of Breast Surgery  [facility] [place]/  [facility] in collaboration with Dr. Kelli Sauceda.  Maranda/Risa Izquierdo [address]. APRN-CNP      June 30, 2020

## 2021-07-01 ENCOUNTER — HOSPITAL ENCOUNTER (OUTPATIENT)
Dept: GENERAL RADIOLOGY | Age: 71
Discharge: HOME | End: 2021-07-03
Payer: MEDICARE

## 2021-07-01 ENCOUNTER — OFFICE VISIT (OUTPATIENT)
Dept: BREAST CENTER | Age: 71
End: 2021-07-01
Payer: MEDICARE

## 2021-07-01 VITALS
BODY MASS INDEX: 18.83 KG/M2 | RESPIRATION RATE: 18 BRPM | SYSTOLIC BLOOD PRESSURE: 126 MMHG | HEART RATE: 53 BPM | HEIGHT: 65 IN | OXYGEN SATURATION: 99 % | DIASTOLIC BLOOD PRESSURE: 70 MMHG | TEMPERATURE: 97.8 F | WEIGHT: 113 LBS

## 2021-07-01 DIAGNOSIS — Z12.31 VISIT FOR SCREENING MAMMOGRAM: Primary | ICD-10-CM

## 2021-07-01 DIAGNOSIS — Z85.3 PERSONAL HISTORY OF BREAST CANCER: ICD-10-CM

## 2021-07-01 DIAGNOSIS — Z12.31 VISIT FOR SCREENING MAMMOGRAM: ICD-10-CM

## 2021-07-01 PROCEDURE — 1123F ACP DISCUSS/DSCN MKR DOCD: CPT | Performed by: NURSE PRACTITIONER

## 2021-07-01 PROCEDURE — G8420 CALC BMI NORM PARAMETERS: HCPCS | Performed by: NURSE PRACTITIONER

## 2021-07-01 PROCEDURE — 4040F PNEUMOC VAC/ADMIN/RCVD: CPT | Performed by: NURSE PRACTITIONER

## 2021-07-01 PROCEDURE — G8427 DOCREV CUR MEDS BY ELIG CLIN: HCPCS | Performed by: NURSE PRACTITIONER

## 2021-07-01 PROCEDURE — 3017F COLORECTAL CA SCREEN DOC REV: CPT | Performed by: NURSE PRACTITIONER

## 2021-07-01 PROCEDURE — 99212 OFFICE O/P EST SF 10 MIN: CPT | Performed by: NURSE PRACTITIONER

## 2021-07-01 PROCEDURE — 1090F PRES/ABSN URINE INCON ASSESS: CPT | Performed by: NURSE PRACTITIONER

## 2021-07-01 PROCEDURE — 4004F PT TOBACCO SCREEN RCVD TLK: CPT | Performed by: NURSE PRACTITIONER

## 2021-07-01 PROCEDURE — G8400 PT W/DXA NO RESULTS DOC: HCPCS | Performed by: NURSE PRACTITIONER

## 2021-07-01 PROCEDURE — 77063 BREAST TOMOSYNTHESIS BI: CPT

## 2021-07-01 PROCEDURE — 99213 OFFICE O/P EST LOW 20 MIN: CPT | Performed by: NURSE PRACTITIONER

## 2021-07-01 NOTE — PATIENT INSTRUCTIONS

## 2022-06-22 DIAGNOSIS — Z12.31 VISIT FOR SCREENING MAMMOGRAM: Primary | ICD-10-CM

## 2022-06-28 ASSESSMENT — ENCOUNTER SYMPTOMS
ABDOMINAL PAIN: 0
CHOKING: 0
COUGH: 0
ABDOMINAL DISTENTION: 0
SHORTNESS OF BREATH: 0

## 2022-06-28 NOTE — PROGRESS NOTES
Subjective:  Hx of left modified radical mastectomy for stage 0 or at most stage I breast cancer at age 30. No adjuvant therapy, (Endocrine, medical, or radiation). Patient ID: Gladis Dolan is a 81 y.o. female. This note was copied forward from the last encounter. Essential components for this patient record were reviewed and verified on this visit including:  recent hospitalizations, recent imaging, PMH, PSH, FH, SOC HX, Allergies, and Medications were reviewed and updated as appropriate. In addition, the assessment and plan were copied from prior office note and updated accordingly. HPI    Patient's Name/Date of Birth: Gladis Dolan / 1950   PCP:  May Joseph Gynecologist: Chemo Barnes     81 y.o. pleasant female presents for follow up today for her history of premenopausal, stage 0-1 Left breast cancer. Surgical pathology, 11/21/1988      Patient does not recall breast marker studies being done. 9/29/92: Right breast lump - Fine needle aspiration biopsy: scant cellular nondiagnostic aspirate insufficient for further evaluation  10/2/92: Excision of right breast mass ~ Pathology: Dense fibrosis and cystic dilation of ductal structures, some of which contain apocrine metaplasia. No evidence of atypia, hyperplasia, malignancy or other pathologic processes are identified. The findings are consistent with fibrocystic change. 01/19/2005:  Left implant removal due to discomfort: Left axillary lymph node excision due to lump:        06/14/17, Right screening mammogram, [facility]:  Negative, BI-RADS 1  06/25/19 [facility]: No mammographic evidence of malignancy. 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 Doing fairly well. She had a recent fall which resulted in injury to her left shoulder.   She has a follow-up scheduled with Dr. Zoltan Norton. HENT: Negative for congestion. Respiratory: Negative for cough, choking and shortness of breath. Still smoking 4-5 cigarettes per day. Cardiovascular: Negative for chest pain and palpitations. Gastrointestinal: Negative for abdominal distention and abdominal pain. Musculoskeletal: Positive for arthralgias. Negative for joint swelling. Hx frozen right shoulder; discomfort was improving of the left shoulder secondary to extensive physical therapy however, she reports she fell again and has reinjured the left shoulder. .   Neurological: Negative for dizziness, light-headedness and headaches. Psychiatric/Behavioral: The patient is not nervous/anxious. Denies any issues with mood, cognition, anxiety, or depression. Patient denies previous history of DVT/PE. Objective:   Physical Exam  Vitals and nursing note reviewed. Constitutional:       General: She is not in acute distress. Appearance: Normal appearance. She is well-developed. She is not diaphoretic. Comments: ECOG remains stable at 0.  pleasant and cooperative. HENT:      Head: Normocephalic and atraumatic. Mouth/Throat:      Pharynx: No oropharyngeal exudate. Eyes:      General: No scleral icterus. Right eye: No discharge. Left eye: No discharge. Conjunctiva/sclera: Conjunctivae normal.      Pupils: Pupils are equal, round, and reactive to light. Neck:      Thyroid: No thyromegaly. Vascular: No JVD. Trachea: No tracheal deviation. Cardiovascular:      Rate and Rhythm: Normal rate and regular rhythm. Heart sounds: Normal heart sounds. No murmur heard. No friction rub. No gallop. Pulmonary:      Effort: Pulmonary effort is normal. No respiratory distress or retractions. Breath sounds: Normal breath sounds. No stridor. No wheezing or rales.    Chest:      Chest wall: No mass, lacerations, deformity, swelling, tenderness or edema. Breasts:      Breasts are asymmetrical.      Right: Normal. No inverted nipple, mass, nipple discharge, skin change, tenderness or supraclavicular adenopathy. Left: Absent. No supraclavicular adenopathy. Comments: Right breast exam is stable and unremarkable. Abdominal:      General: There is no distension. Palpations: Abdomen is soft. Tenderness: There is no abdominal tenderness. There is no guarding or rebound. Musculoskeletal:         General: No tenderness or deformity. Right shoulder: Normal.      Left shoulder: Decreased range of motion. Cervical back: Normal range of motion and neck supple. Lymphadenopathy:      Cervical: No cervical adenopathy. Right cervical: No superficial, deep or posterior cervical adenopathy. Left cervical: No superficial, deep or posterior cervical adenopathy. Upper Body:      Right upper body: No supraclavicular or pectoral adenopathy. Left upper body: No supraclavicular or pectoral adenopathy. Skin:     General: Skin is warm and dry. Coloration: Skin is not pale. Findings: No erythema or rash. Neurological:      Mental Status: She is alert and oriented to person, place, and time. Coordination: Coordination normal.   Psychiatric:         Behavior: Behavior normal.         Thought Content: Thought content normal.         Judgment: Judgment normal.        Assessment:      81 y.o. woman who, with review of old records, it appears the patient had a left modified radical mastectomy for stage 0 or at most stage I breast cancer at age 30. Never had any adjuvant therapy, medical or radiation. Presented 9/12/2017 to establish care. Left breast cancer:  Post left modified radical mastectomy with axillary node dissection (22) and left breast mammoplasty on 11/18/1988:            Surgical pathology, 11/21/1988      Patient does not recall breast marker studies being done.    09/29/1992: Right breast lump - Fine needle aspiration biopsy: scant cellular nondiagnostic aspirate insufficient for further evaluation  10/2/92: Excision of right breast mass ~ Pathology: No eviidence of atypia, hyperplasia, malignancy or other pathologic processes are identified. Fibrocystic change. 01/19/2005:Left implant removal due to discomfort: Left axillary LN excision @ [facility] due to lump:  No evidence of malignancy. 06/25/2019 right screening mammogram: Negative, BI-RADS 1.  06/30/2020 right screening mammogram:  Negative. BI-RADS 1.  07/01/2021 right screening mammogram:  Negative. BI-RADS 1. Clinical follow-up is without evidence of recurrent disease. 07/05/2022 right screening mammogram:  Negative. B-RADS 1. Clinically no evidence of recurrent disease. Imaging reviewed, including BI-RADS result. She had a recent fall which resulted in acute injury to the left shoulder. Imaging done at urgent care and reported as negative. Has a follow-up appointment to see Dr. Yessi Abarca. We had a long discussion on options going forward at this time, we will plan to see in 1 year with right screening mammogram same day and as needed.         -02/20/2019 Genetic testing with [employer] gene test assay: Normal, no mutations. Plan:   1. Continue monthly breast/chest wall self examination; detailed instructions reviewed today. Bring any changes to your physician's attention. 2. Continue healthy diet and exercise routinely as tolerated. 3. Avoid alcohol. 4. Limit caffeine intake. 5. Stop smoking encouraged. 6. Wear a good support bra at all times. 7. Repeat mammogram 1 year. 8. Continue follow up with Primary Care/Gynecology and all specialties as directed. 9. RTC 1 year with mammogram same day.        I spent a total of 25 minutes on the date of the service which included preparing to see the patient, face-to-face patient care, completing clinical documentation, obtaining and/or reviewing separately obtained history, performing a medically appropriate examination, counseling and educating the patient/family/caregiver, ordering medications, tests, or procedures, communicating with other HCPs (not separately reported), independently interpreting results (not separately reported), communicating results to the patient/family/caregiver and care coordination (not separately reported). Tl Choudhury, RN, MSN, APRN-CNP, [address]  Advanced Oncology Certified Nurse Practitioner  Department of Breast Surgery  [facility]/  [facility] in collaboration with Dr. Jaron Blackwood.  Maranda/Risa Donis APRN-CNP

## 2022-07-03 ENCOUNTER — HOSPITAL ENCOUNTER (EMERGENCY)
Age: 72
Discharge: HOME | End: 2022-07-03
Payer: MEDICARE

## 2022-07-03 ENCOUNTER — APPOINTMENT (OUTPATIENT)
Dept: GENERAL RADIOLOGY | Age: 72
End: 2022-07-03
Payer: MEDICARE

## 2022-07-03 VITALS
HEIGHT: 64 IN | SYSTOLIC BLOOD PRESSURE: 135 MMHG | BODY MASS INDEX: 19.63 KG/M2 | DIASTOLIC BLOOD PRESSURE: 71 MMHG | HEART RATE: 67 BPM | WEIGHT: 115 LBS | TEMPERATURE: 98.1 F | RESPIRATION RATE: 18 BRPM | OXYGEN SATURATION: 97 %

## 2022-07-03 DIAGNOSIS — S29.011A CHEST WALL MUSCLE STRAIN, INITIAL ENCOUNTER: ICD-10-CM

## 2022-07-03 DIAGNOSIS — S46.912A SHOULDER STRAIN, LEFT, INITIAL ENCOUNTER: Primary | ICD-10-CM

## 2022-07-03 PROCEDURE — 71101 X-RAY EXAM UNILAT RIBS/CHEST: CPT

## 2022-07-03 PROCEDURE — 99211 OFF/OP EST MAY X REQ PHY/QHP: CPT

## 2022-07-03 PROCEDURE — 73030 X-RAY EXAM OF SHOULDER: CPT

## 2022-07-03 RX ORDER — IBUPROFEN 200 MG
200 TABLET ORAL EVERY 6 HOURS PRN
COMMUNITY

## 2022-07-03 ASSESSMENT — PAIN DESCRIPTION - LOCATION: LOCATION: SHOULDER;CHEST

## 2022-07-03 ASSESSMENT — PAIN DESCRIPTION - PAIN TYPE: TYPE: ACUTE PAIN

## 2022-07-03 ASSESSMENT — PAIN DESCRIPTION - ORIENTATION: ORIENTATION: LEFT

## 2022-07-03 ASSESSMENT — PAIN DESCRIPTION - ONSET: ONSET: SUDDEN

## 2022-07-03 ASSESSMENT — PAIN - FUNCTIONAL ASSESSMENT: PAIN_FUNCTIONAL_ASSESSMENT: 0-10

## 2022-07-03 ASSESSMENT — PAIN DESCRIPTION - FREQUENCY: FREQUENCY: CONTINUOUS

## 2022-07-03 ASSESSMENT — PAIN DESCRIPTION - DESCRIPTORS: DESCRIPTORS: SORE;SHARP

## 2022-07-03 ASSESSMENT — PAIN SCALES - GENERAL: PAINLEVEL_OUTOF10: 5

## 2022-07-03 NOTE — ED PROVIDER NOTES
[address] [facility], located within [facility]  Department of Emergency Medicine  UC Encounter Note  7/3/22   5:07 PM EDT      NAME: Jerry Carvalho  :  1950  MRN:  [id number]    Chief Complaint: Fall (States she fell while walking her dog. States she hit her face on the grass. Right side of face slightly swollen. Denies LOC. Having pain in left shoulder and left chest area. States pain increases with deep inspiration. Has abrasions on right elbow and both knees.)      This is a 82-year-old female the presents to urgent care complaining of left shoulder and left rib cage chest wall pain. She states she had fallen down after her dog tripped her. She did hit the right lower chin area on some grass but denies any pain to her face at this time. She denies any other head injury. No neck pain. Is mostly the left shoulder and rib cage area that she has tenderness. She denies abdominal pain. She does state a history of left shoulder problems and has seen an orthopedist for this. She has been through physical therapy recently she states for the shoulder problem. She denies any numbness or tingling. She does complain of some chest wall discomfort with breathing. She denies any other extremity pain. No abdominal pain. No numbness or tingling. On first contact patient she appears to be in no acute distress. Review of Systems  Pertinent positives and negatives are stated within HPI, all other systems reviewed and are negative. Physical Exam  Vitals and nursing note reviewed. Constitutional:       Appearance: She is well-developed. HENT:      Head: Normocephalic. No raccoon eyes, Campbell's sign, right periorbital erythema or left periorbital erythema. Jaw: There is normal jaw occlusion. No trismus, tenderness, swelling or pain on movement.       Right Ear: Hearing, tympanic membrane, ear canal and external ear normal.      Left Ear: Hearing, tympanic membrane, ear canal and external ear normal.      Nose: Nose normal.      Right Nostril: No epistaxis. Left Nostril: No epistaxis. Mouth/Throat:      Mouth: Mucous membranes are moist.      Pharynx: Oropharynx is clear. Uvula midline. Eyes:      General: Lids are normal.      Conjunctiva/sclera: Conjunctivae normal.      Pupils: Pupils are equal, round, and reactive to light. Cardiovascular:      Rate and Rhythm: Normal rate and regular rhythm. Heart sounds: Normal heart sounds. No murmur heard. Pulmonary:      Effort: Pulmonary effort is normal.      Breath sounds: Normal breath sounds. Abdominal:      General: Bowel sounds are normal.      Palpations: Abdomen is soft. Abdomen is not rigid. Tenderness: There is no abdominal tenderness. There is no guarding or rebound. Musculoskeletal:      Cervical back: Full passive range of motion without pain, normal range of motion and neck supple. No spinous process tenderness or muscular tenderness. Comments: Patient has tenderness to the left shoulder area and left posterior shoulder. Left rib cage area is tender. No crepitus. Lungs are clear to auscultation. Skin:     General: Skin is warm and dry. Findings: Abrasion present. No rash. Comments: From mild abrasions to her extremities mostly her arms. Neurological:      General: No focal deficit present. Mental Status: She is alert and oriented to person, place, and time. GCS: GCS eye subscore is 4. GCS verbal subscore is 5. GCS motor subscore is 6. Cranial Nerves: No cranial nerve deficit. Sensory: No sensory deficit. Coordination: Coordination normal.      Gait: Gait normal.         Procedures    MDM  Number of Diagnoses or Management Options  Chest wall muscle strain, initial encounter  Shoulder strain, left, initial encounter  Diagnosis management comments: Patient is in no acute distress. X-rays were negative. Head appears to be atraumatic.   Distal take over-the-counter medications for pain she does have an appointment with her orthopedic surgeon. Instructions given.           --------------------------------------------- PAST HISTORY ---------------------------------------------  Past Medical History:  has a past medical history of BRCA1 negative, BRCA2 negative, Breast cancer ([facility] [address] Cancer ([facility] and Vasovagal syndrome. Past Surgical History:  has a past surgical history that includes Breast reconstruction (Left, 1988) and Breast biopsy (Right, 1992). Social History:  reports that she has been smoking cigarettes. She has a 10.00 pack-year smoking history. She has never used smokeless tobacco. She reports that she does not drink alcohol and does not use drugs. Family History: family history includes Breast Cancer (age of onset: 61) in her paternal cousin; Heart Disease in her father; Mary Jane Pickup in her brother; Prostate Cancer (age of onset: 79) in her father. The patients home medications have been reviewed. Allergies: Celebrex [celecoxib]    -------------------------------------------------- RESULTS -------------------------------------------------  No results found for this visit on 07/03/22. XR SHOULDER LEFT (MIN 2 VIEWS)   Final Result   No fracture or dislocation. XR RIBS LEFT INCLUDE CHEST (MIN 3 VIEWS)   Final Result   No fracture or dislocation. Osteo-degenerative changes involving the   visualized lumbar spine.             ------------------------- NURSING NOTES AND VITALS REVIEWED ---------------------------   The nursing notes within the ED encounter and vital signs as below have been reviewed.    /71   Pulse 67   Temp 98.1 °F (36.7 °C) (Infrared)   Resp 18   Ht 5' 4\" (1.626 m)   Wt 115 lb (52.2 kg)   SpO2 97%   BMI 19.74 kg/m²   Oxygen Saturation Interpretation: Normal      ------------------------------------------ PROGRESS NOTES ------------------------------------------   I have spoken with the patient and discussed todays results, in addition to providing specific details for the plan of care and counseling regarding the diagnosis and prognosis. Their questions are answered at this time and they are agreeable with the plan.      --------------------------------- ADDITIONAL PROVIDER NOTES ---------------------------------     This patient is stable for discharge. I have shared the specific conditions for return, as well as the importance of follow-up. * NOTE: This report was transcribed using voice recognition software. Every effort was made to ensure accuracy; however, inadvertent computerized transcription errors may be present.    --------------------------------- IMPRESSION AND DISPOSITION ---------------------------------    IMPRESSION  1. Shoulder strain, left, initial encounter    2.  Chest wall muscle strain, initial encounter        DISPOSITION  Disposition: Discharge to home  Patient condition is good       Gamaliel Velazquez PA-C  07/03/22 1800

## 2022-07-05 ENCOUNTER — HOSPITAL ENCOUNTER (OUTPATIENT)
Dept: GENERAL RADIOLOGY | Age: 72
Discharge: HOME | End: 2022-07-07
Payer: MEDICARE

## 2022-07-05 ENCOUNTER — OFFICE VISIT (OUTPATIENT)
Dept: BREAST CENTER | Age: 72
End: 2022-07-05
Payer: MEDICARE

## 2022-07-05 VITALS — WEIGHT: 115 LBS | HEIGHT: 64 IN | BODY MASS INDEX: 19.63 KG/M2

## 2022-07-05 VITALS
HEART RATE: 60 BPM | SYSTOLIC BLOOD PRESSURE: 110 MMHG | BODY MASS INDEX: 20.13 KG/M2 | TEMPERATURE: 97.3 F | RESPIRATION RATE: 18 BRPM | OXYGEN SATURATION: 98 % | WEIGHT: 117.9 LBS | HEIGHT: 64 IN | DIASTOLIC BLOOD PRESSURE: 62 MMHG

## 2022-07-05 DIAGNOSIS — G89.11 ACUTE PAIN OF LEFT SHOULDER DUE TO TRAUMA: Primary | ICD-10-CM

## 2022-07-05 DIAGNOSIS — M25.512 ACUTE PAIN OF LEFT SHOULDER DUE TO TRAUMA: Primary | ICD-10-CM

## 2022-07-05 DIAGNOSIS — Z12.31 VISIT FOR SCREENING MAMMOGRAM: ICD-10-CM

## 2022-07-05 DIAGNOSIS — Z85.3 PERSONAL HISTORY OF BREAST CANCER: ICD-10-CM

## 2022-07-05 PROBLEM — M19.011 ARTHRITIS OF RIGHT ACROMIOCLAVICULAR JOINT: Status: ACTIVE | Noted: 2020-02-24

## 2022-07-05 PROBLEM — M75.41 IMPINGEMENT SYNDROME OF RIGHT SHOULDER REGION: Status: ACTIVE | Noted: 2020-06-08

## 2022-07-05 PROCEDURE — G8420 CALC BMI NORM PARAMETERS: HCPCS | Performed by: NURSE PRACTITIONER

## 2022-07-05 PROCEDURE — G8400 PT W/DXA NO RESULTS DOC: HCPCS | Performed by: NURSE PRACTITIONER

## 2022-07-05 PROCEDURE — 4004F PT TOBACCO SCREEN RCVD TLK: CPT | Performed by: NURSE PRACTITIONER

## 2022-07-05 PROCEDURE — 99213 OFFICE O/P EST LOW 20 MIN: CPT | Performed by: NURSE PRACTITIONER

## 2022-07-05 PROCEDURE — 1123F ACP DISCUSS/DSCN MKR DOCD: CPT | Performed by: NURSE PRACTITIONER

## 2022-07-05 PROCEDURE — 77063 BREAST TOMOSYNTHESIS BI: CPT

## 2022-07-05 PROCEDURE — G8427 DOCREV CUR MEDS BY ELIG CLIN: HCPCS | Performed by: NURSE PRACTITIONER

## 2022-07-05 PROCEDURE — 1090F PRES/ABSN URINE INCON ASSESS: CPT | Performed by: NURSE PRACTITIONER

## 2022-07-05 PROCEDURE — 3017F COLORECTAL CA SCREEN DOC REV: CPT | Performed by: NURSE PRACTITIONER

## 2022-11-10 ENCOUNTER — HOSPITAL ENCOUNTER (OUTPATIENT)
Dept: GENERAL RADIOLOGY | Age: 72
Discharge: HOME | End: 2022-11-12
Payer: MEDICARE

## 2022-11-10 VITALS — WEIGHT: 115 LBS | BODY MASS INDEX: 19.63 KG/M2 | HEIGHT: 64 IN

## 2022-11-10 DIAGNOSIS — N64.4 BREAST PAIN: ICD-10-CM

## 2022-11-10 PROCEDURE — 76642 ULTRASOUND BREAST LIMITED: CPT

## 2023-01-16 ENCOUNTER — APPOINTMENT (OUTPATIENT)
Dept: GENERAL RADIOLOGY | Age: 73
End: 2023-01-16
Payer: MEDICARE

## 2023-01-16 ENCOUNTER — HOSPITAL ENCOUNTER (EMERGENCY)
Age: 73
Discharge: HOME | End: 2023-01-16
Payer: MEDICARE

## 2023-01-16 VITALS
RESPIRATION RATE: 18 BRPM | SYSTOLIC BLOOD PRESSURE: 108 MMHG | DIASTOLIC BLOOD PRESSURE: 60 MMHG | HEART RATE: 82 BPM | OXYGEN SATURATION: 99 % | HEIGHT: 65 IN | BODY MASS INDEX: 19.66 KG/M2 | WEIGHT: 118 LBS | TEMPERATURE: 97.9 F

## 2023-01-16 DIAGNOSIS — S90.31XA CONTUSION OF RIGHT FOOT, INITIAL ENCOUNTER: Primary | ICD-10-CM

## 2023-01-16 PROCEDURE — 73630 X-RAY EXAM OF FOOT: CPT

## 2023-01-16 PROCEDURE — 99211 OFF/OP EST MAY X REQ PHY/QHP: CPT

## 2023-01-16 RX ORDER — IBUPROFEN 800 MG/1
800 TABLET ORAL EVERY 6 HOURS PRN
Qty: 21 TABLET | Refills: 0 | Status: SHIPPED | OUTPATIENT
Start: 2023-01-16

## 2023-01-16 ASSESSMENT — PAIN DESCRIPTION - ORIENTATION: ORIENTATION: RIGHT

## 2023-01-16 ASSESSMENT — PAIN - FUNCTIONAL ASSESSMENT: PAIN_FUNCTIONAL_ASSESSMENT: 0-10

## 2023-01-16 ASSESSMENT — PAIN SCALES - GENERAL: PAINLEVEL_OUTOF10: 8

## 2023-01-16 ASSESSMENT — PAIN DESCRIPTION - DESCRIPTORS: DESCRIPTORS: DISCOMFORT;TENDER

## 2023-01-16 ASSESSMENT — PAIN DESCRIPTION - LOCATION: LOCATION: FOOT

## 2023-01-17 NOTE — ED PROVIDER NOTES
HPI: Husam Petit 81 y.o. female with a past medical history of   Past Medical History:   Diagnosis Date    BRCA1 negative     BRCA2 negative     Breast cancer ([facility] 1988    Cancer ([facility] 1988    left breast cancer    Vasovagal syndrome     neurocardiogenic syncope     presents status post right foot injury. The patient states that the injury happened acutely. The history is obtained from the patient. The mechanism of injury is apparent and was blunt stating that she kicked her dog. Patient describes the pain as aching and pressure. Patient states the pain worsens on ambulation and with any weightbearing. Patient denies any distal neurologic symptoms including numbness, tingling, parapysis or coolness of the foot. No other reported injuries or other extremity tenderness or injuries. States that she kicked the dog with the right foot and is having pain to the dorsal lateral aspect of the right foot. ROS:   Pertinent positives and negatives are stated within HPI, all other systems reviewed and are negative.    --------------------------------------------- PAST HISTORY ---------------------------------------------  Past Medical History:  has a past medical history of BRCA1 negative, BRCA2 negative, [facility] ([facility] Cancer ([facility] and Vasovagal syndrome. Past Surgical History:  has a past surgical history that includes Breast reconstruction (Left, 1988); Breast biopsy (Right, 1992); and Mastectomy (Left). Social History:  reports that she has been smoking cigarettes. She has a 10.00 pack-year smoking history. She has never used smokeless tobacco. She reports that she does not drink alcohol and does not use drugs. Family History: family history includes Breast Cancer (age of onset: 61) in her paternal cousin; Heart Disease in her father; Kuo Dings in her brother; Prostate Cancer (age of onset: 79) in her father. The patients home medications have been reviewed.     Allergies: Celebrex [celecoxib]    Physical exam:  Constitutional: The patient is comfortable, well appearing, non toxic. The patient is alert and oriented and conversant. Head: The head is atraumatic and normocephalic. Eyes: No discharge is present from the eyes. The sclera are normal.     ENT: The oropharynx is normal. The mouth is normal to inspection. Neck: Normal range of motion is achieved in the neck. Lorean Snare Respiratory/chest: The chest is nontender. Breath sounds are normal. There is no respiratory distress. Cardiovascular: Heart shows a regular rate and rhythm without murmurs, rubs or gallops. Lower extremity exam: There is no obvious compartment syndrome. The knee evaluation shows that both knees have no deformity, no swelling, and no proximal fibular head tenderness. There is full painless range of motion of both hips. The ankle evaluation shows normal tendon function, capillary refill less than 2 seconds, distal motor function which is intact, distal sensory function which is intact. The achilies tendon is intact. There is localized swelling and tenderness noted of the dorsal aspect of the right foot. there are no lacerations or evidence of open fracture.      ------------------------- NURSING NOTES AND VITALS REVIEWED ---------------------------   The nursing notes within the ED encounter and vital signs as below have been reviewed by myself. /60   Pulse 82   Temp 97.9 °F (36.6 °C)   Resp 18   Ht 5' 4.5\" (1.638 m)   Wt 118 lb (53.5 kg)   SpO2 99%   BMI 19.94 kg/m²   Oxygen Saturation Interpretation: Normal    The patients available past medical records and past encounters were reviewed. -------------------------------------------------- RESULTS -------------------------------------------------  I have personally reviewed all laboratory and imaging results for this patient. Results are listed below. LABS:  No results found for this visit on 01/16/23.     RADIOLOGY:  Interpreted by Radiologist.  XR FOOT RIGHT (MIN 3 VIEWS)   Final Result   Mild osteoarthritic change with mild soft tissue swelling. No evidence of fracture or subluxation.                 ------------------------------ ED COURSE/MEDICAL DECISION MAKING----------------------  Medications - No data to display          Medical decision making: States that she kicked the dog with the right foot and is having pain to the dorsal lateral aspect of the right foot. Right foot injury with concerns for a fracture based on physical exam. Films are obtained and are negative for fracture at this time. Plan is subsequently for symptom control limited weight-bearing and foot immobilization.         --------------------------------- IMPRESSION AND DISPOSITION ---------------------------------    IMPRESSION  1.  Contusion of right foot, initial encounter        DISPOSITION  Disposition: Discharge to home  Patient condition is good  ]         ELIZA Louise - ALBARO  01/16/23 2027 [FreeTextEntry1] : rapid covid negative\par Symptomatic treatment\par Maintain adequate hydration \par Cool mist humidifier\par Saline nose drops and bulb suctioning as needed\par Stressed handwashing and infection control \par Pay close observation for new or worsening symptoms\par Instructed to return to office if symptoms worsen/persist or febrile\par Go to ER or UC if condition worsens or unable to to get to the office or after office hours\par

## 2023-05-04 ENCOUNTER — PROCEDURE VISIT (OUTPATIENT)
Dept: AUDIOLOGY | Age: 73
End: 2023-05-04
Payer: MEDICARE

## 2023-05-04 ENCOUNTER — OFFICE VISIT (OUTPATIENT)
Dept: ENT CLINIC | Age: 73
End: 2023-05-04
Payer: MEDICARE

## 2023-05-04 VITALS
WEIGHT: 124 LBS | SYSTOLIC BLOOD PRESSURE: 107 MMHG | HEART RATE: 62 BPM | DIASTOLIC BLOOD PRESSURE: 66 MMHG | HEIGHT: 65 IN | BODY MASS INDEX: 20.66 KG/M2

## 2023-05-04 DIAGNOSIS — H93.8X3 SENSATION OF FULLNESS IN BOTH EARS: ICD-10-CM

## 2023-05-04 DIAGNOSIS — M26.623 BILATERAL TEMPOROMANDIBULAR JOINT PAIN: ICD-10-CM

## 2023-05-04 DIAGNOSIS — H93.8X2 SENSATION OF FULLNESS IN LEFT EAR: ICD-10-CM

## 2023-05-04 DIAGNOSIS — H93.8X2 PRESSURE SENSATION IN LEFT EAR: Primary | ICD-10-CM

## 2023-05-04 DIAGNOSIS — H69.83 DYSFUNCTION OF BOTH EUSTACHIAN TUBES: ICD-10-CM

## 2023-05-04 DIAGNOSIS — H73.899 DECREASED MOBILITY OF TYMPANIC MEMBRANE: Primary | ICD-10-CM

## 2023-05-04 PROCEDURE — 4004F PT TOBACCO SCREEN RCVD TLK: CPT | Performed by: NURSE PRACTITIONER

## 2023-05-04 PROCEDURE — 3017F COLORECTAL CA SCREEN DOC REV: CPT | Performed by: NURSE PRACTITIONER

## 2023-05-04 PROCEDURE — 92567 TYMPANOMETRY: CPT | Performed by: AUDIOLOGIST

## 2023-05-04 PROCEDURE — 99203 OFFICE O/P NEW LOW 30 MIN: CPT | Performed by: NURSE PRACTITIONER

## 2023-05-04 PROCEDURE — G8420 CALC BMI NORM PARAMETERS: HCPCS | Performed by: NURSE PRACTITIONER

## 2023-05-04 PROCEDURE — G8400 PT W/DXA NO RESULTS DOC: HCPCS | Performed by: NURSE PRACTITIONER

## 2023-05-04 PROCEDURE — 1123F ACP DISCUSS/DSCN MKR DOCD: CPT | Performed by: NURSE PRACTITIONER

## 2023-05-04 PROCEDURE — G8427 DOCREV CUR MEDS BY ELIG CLIN: HCPCS | Performed by: NURSE PRACTITIONER

## 2023-05-04 PROCEDURE — 1090F PRES/ABSN URINE INCON ASSESS: CPT | Performed by: NURSE PRACTITIONER

## 2023-05-04 PROCEDURE — 92557 COMPREHENSIVE HEARING TEST: CPT | Performed by: AUDIOLOGIST

## 2023-05-04 RX ORDER — FLUTICASONE PROPIONATE 50 MCG
2 SPRAY, SUSPENSION (ML) NASAL DAILY
Qty: 16 G | Refills: 1 | Status: SHIPPED | OUTPATIENT
Start: 2023-05-04

## 2023-05-04 RX ORDER — FLUTICASONE PROPIONATE 50 MCG
SPRAY, SUSPENSION (ML) NASAL
Qty: 48 G | OUTPATIENT
Start: 2023-05-04

## 2023-05-04 RX ORDER — ECHINACEA PURPUREA EXTRACT 125 MG
2 TABLET ORAL 4 TIMES DAILY
Qty: 3 EACH | Refills: 3 | Status: SHIPPED | OUTPATIENT
Start: 2023-05-04

## 2023-05-04 ASSESSMENT — ENCOUNTER SYMPTOMS
EYES NEGATIVE: 1
SINUS PRESSURE: 0
RHINORRHEA: 0
STRIDOR: 0
SHORTNESS OF BREATH: 0
RESPIRATORY NEGATIVE: 1
SINUS PAIN: 0

## 2023-05-04 NOTE — PROGRESS NOTES
This patient was referred for audiometric and tympanometric testing by GOLDY Goodman due to ear pressure, that is worse in the left ear. Patient denied hearing loss, tinnitus and vertigo. Audiometry using pure tone air and bone conduction testing revealed a normal-to-mild  sensorineural hearing loss, through the frequency range, bilaterally. Reliability was good. Speech reception thresholds were in good agreement with the pure tone averages, bilaterally. Speech discrimination scores were 96%, bilaterally at 50-55dBHL. Tympanometry revealed normal middle ear peak pressure and reduced compliance, bilaterally. Ipsilateral acoustic reflexes were present, bilaterally at 1000Hz. The results were reviewed with the patient. Recommendations for follow up will be made pending physician consult.     Ana Mitchell CCC/MAURICE  Audiologist  [id number]  NPI#:  [id number]      Electronically signed by Karla Zelaya on 5/4/2023 at 8:50 AM

## 2023-05-04 NOTE — PROGRESS NOTES
[id number] [facility] Otolaryngology  Dr. Jeannette Man. Ilir Meneses. Ms.Ed        Patient Name:  Jorge Dennis  :  1950     CHIEF C/O:    Chief Complaint   Patient presents with    Follow-up     Pt states left ear sounds like she is in a tunnel some times, does not occur all the time but sometimes. HISTORY OBTAINED FROM:  patient    HISTORY OF PRESENT ILLNESS:       Tristan Wise is a 81y.o. year old female, here today for follow up of ear fullness and muffled hearing.     Symptoms for 1 year  Intermittent fullness in the left ear with muffled hearing late in the day since onset  Now having same symptoms on right  No hx of recurrent ear infections or previous ear surgeries  No previous dx of hearing loss  No family hx of hearing loss  No noise exposure hx  Denies any pain or drainage  States it feels like she is talking in a tunnel  Can place pressure to the neck and manipulate head to improve symptoms  Patient does have clenching/grinding hx, wears bite guard at night  Symptoms seem to have started after the passing of her brother and new total care of her mother in 2021          Past Medical History:   Diagnosis Date    BRCA1 negative     BRCA2 negative     Breast cancer ([facility] [address]     Cancer ([facility] [address] 1988    left breast cancer    Vasovagal syndrome     neurocardiogenic syncope     Past Surgical History:   Procedure Laterality Date    BREAST BIOPSY Right     BREAST RECONSTRUCTION Left     MASTECTOMY Left        Current Outpatient Medications:     ibuprofen (ADVIL;MOTRIN) 800 MG tablet, Take 1 tablet by mouth every 6 hours as needed for Pain, Disp: 21 tablet, Rfl: 0    ibuprofen (ADVIL;MOTRIN) 200 MG tablet, Take 1 tablet by mouth every 6 hours as needed for Pain, Disp: , Rfl:     b complex vitamins capsule, Take 1 capsule by mouth daily, Disp: , Rfl:     vitamin D (CHOLECALCIFEROL) 25 MCG (1000 UT) TABS tablet, Take 1 tablet by mouth daily, Disp: , Rfl:     BL EVENING PRIMROSE OIL PO, Take by mouth, Disp:

## 2023-06-26 ENCOUNTER — OFFICE VISIT (OUTPATIENT)
Dept: ENT CLINIC | Age: 73
End: 2023-06-26
Payer: MEDICARE

## 2023-06-26 ENCOUNTER — PROCEDURE VISIT (OUTPATIENT)
Dept: AUDIOLOGY | Age: 73
End: 2023-06-26
Payer: MEDICARE

## 2023-06-26 VITALS
WEIGHT: 119 LBS | HEART RATE: 46 BPM | DIASTOLIC BLOOD PRESSURE: 59 MMHG | BODY MASS INDEX: 20.32 KG/M2 | SYSTOLIC BLOOD PRESSURE: 102 MMHG | HEIGHT: 64 IN

## 2023-06-26 DIAGNOSIS — H69.83 DYSFUNCTION OF BOTH EUSTACHIAN TUBES: Primary | ICD-10-CM

## 2023-06-26 DIAGNOSIS — M26.623 BILATERAL TEMPOROMANDIBULAR JOINT PAIN: ICD-10-CM

## 2023-06-26 DIAGNOSIS — H93.8X3 SENSATION OF FULLNESS IN BOTH EARS: ICD-10-CM

## 2023-06-26 DIAGNOSIS — H73.899 DECREASED MOBILITY OF TYMPANIC MEMBRANE: ICD-10-CM

## 2023-06-26 PROCEDURE — G8400 PT W/DXA NO RESULTS DOC: HCPCS | Performed by: NURSE PRACTITIONER

## 2023-06-26 PROCEDURE — 4004F PT TOBACCO SCREEN RCVD TLK: CPT | Performed by: NURSE PRACTITIONER

## 2023-06-26 PROCEDURE — 99213 OFFICE O/P EST LOW 20 MIN: CPT | Performed by: NURSE PRACTITIONER

## 2023-06-26 PROCEDURE — G8420 CALC BMI NORM PARAMETERS: HCPCS | Performed by: NURSE PRACTITIONER

## 2023-06-26 PROCEDURE — 1123F ACP DISCUSS/DSCN MKR DOCD: CPT | Performed by: NURSE PRACTITIONER

## 2023-06-26 PROCEDURE — 92567 TYMPANOMETRY: CPT | Performed by: AUDIOLOGIST

## 2023-06-26 PROCEDURE — G8427 DOCREV CUR MEDS BY ELIG CLIN: HCPCS | Performed by: NURSE PRACTITIONER

## 2023-06-26 PROCEDURE — 1090F PRES/ABSN URINE INCON ASSESS: CPT | Performed by: NURSE PRACTITIONER

## 2023-06-26 PROCEDURE — 3017F COLORECTAL CA SCREEN DOC REV: CPT | Performed by: NURSE PRACTITIONER

## 2023-06-26 RX ORDER — FLUTICASONE PROPIONATE 50 MCG
2 SPRAY, SUSPENSION (ML) NASAL DAILY
Qty: 48 G | Refills: 1 | Status: SHIPPED | OUTPATIENT
Start: 2023-06-26

## 2023-06-26 ASSESSMENT — ENCOUNTER SYMPTOMS
SHORTNESS OF BREATH: 0
SINUS PRESSURE: 0
STRIDOR: 0
EYES NEGATIVE: 1
SINUS PAIN: 0
RHINORRHEA: 0
CHOKING: 0
RESPIRATORY NEGATIVE: 1
COUGH: 0
SHORTNESS OF BREATH: 0

## 2023-07-06 ENCOUNTER — HOSPITAL ENCOUNTER (OUTPATIENT)
Dept: GENERAL RADIOLOGY | Age: 73
Discharge: HOME | End: 2023-07-08
Payer: MEDICARE

## 2023-07-06 ENCOUNTER — OFFICE VISIT (OUTPATIENT)
Dept: BREAST CENTER | Age: 73
End: 2023-07-06
Payer: MEDICARE

## 2023-07-06 VITALS
SYSTOLIC BLOOD PRESSURE: 102 MMHG | OXYGEN SATURATION: 97 % | DIASTOLIC BLOOD PRESSURE: 60 MMHG | TEMPERATURE: 97.3 F | RESPIRATION RATE: 16 BRPM | BODY MASS INDEX: 20.14 KG/M2 | WEIGHT: 118 LBS | HEIGHT: 64 IN | HEART RATE: 63 BPM

## 2023-07-06 VITALS — WEIGHT: 115 LBS | BODY MASS INDEX: 19.63 KG/M2 | HEIGHT: 64 IN

## 2023-07-06 DIAGNOSIS — Z85.3 PERSONAL HISTORY OF BREAST CANCER: Primary | ICD-10-CM

## 2023-07-06 DIAGNOSIS — Z12.31 ENCOUNTER FOR SCREENING MAMMOGRAM FOR BREAST CANCER: ICD-10-CM

## 2023-07-06 PROCEDURE — 77063 BREAST TOMOSYNTHESIS BI: CPT

## 2023-07-06 PROCEDURE — 99213 OFFICE O/P EST LOW 20 MIN: CPT | Performed by: NURSE PRACTITIONER

## 2023-09-27 ENCOUNTER — OFFICE VISIT (OUTPATIENT)
Dept: ENT CLINIC | Age: 73
End: 2023-09-27
Payer: MEDICARE

## 2023-09-27 ENCOUNTER — PROCEDURE VISIT (OUTPATIENT)
Dept: AUDIOLOGY | Age: 73
End: 2023-09-27
Payer: MEDICARE

## 2023-09-27 VITALS
SYSTOLIC BLOOD PRESSURE: 88 MMHG | DIASTOLIC BLOOD PRESSURE: 58 MMHG | HEIGHT: 64 IN | WEIGHT: 117.5 LBS | HEART RATE: 67 BPM | BODY MASS INDEX: 20.06 KG/M2

## 2023-09-27 DIAGNOSIS — H73.899 DECREASED MOBILITY OF TYMPANIC MEMBRANE: ICD-10-CM

## 2023-09-27 DIAGNOSIS — H65.493 COME (CHRONIC OTITIS MEDIA WITH EFFUSION), BILATERAL: Primary | ICD-10-CM

## 2023-09-27 DIAGNOSIS — H93.8X3 SENSATION OF FULLNESS IN BOTH EARS: ICD-10-CM

## 2023-09-27 DIAGNOSIS — M26.623 BILATERAL TEMPOROMANDIBULAR JOINT PAIN: ICD-10-CM

## 2023-09-27 DIAGNOSIS — H69.93 DYSFUNCTION OF BOTH EUSTACHIAN TUBES: Primary | ICD-10-CM

## 2023-09-27 PROCEDURE — 1123F ACP DISCUSS/DSCN MKR DOCD: CPT | Performed by: NURSE PRACTITIONER

## 2023-09-27 PROCEDURE — 3017F COLORECTAL CA SCREEN DOC REV: CPT | Performed by: NURSE PRACTITIONER

## 2023-09-27 PROCEDURE — 1090F PRES/ABSN URINE INCON ASSESS: CPT | Performed by: NURSE PRACTITIONER

## 2023-09-27 PROCEDURE — G8420 CALC BMI NORM PARAMETERS: HCPCS | Performed by: NURSE PRACTITIONER

## 2023-09-27 PROCEDURE — 92567 TYMPANOMETRY: CPT | Performed by: AUDIOLOGIST

## 2023-09-27 PROCEDURE — G8427 DOCREV CUR MEDS BY ELIG CLIN: HCPCS | Performed by: NURSE PRACTITIONER

## 2023-09-27 PROCEDURE — 99213 OFFICE O/P EST LOW 20 MIN: CPT | Performed by: NURSE PRACTITIONER

## 2023-09-27 PROCEDURE — 4004F PT TOBACCO SCREEN RCVD TLK: CPT | Performed by: NURSE PRACTITIONER

## 2023-09-27 PROCEDURE — G8400 PT W/DXA NO RESULTS DOC: HCPCS | Performed by: NURSE PRACTITIONER

## 2023-09-27 RX ORDER — AZELASTINE 1 MG/ML
SPRAY, METERED NASAL
Qty: 90 ML | OUTPATIENT
Start: 2023-09-27

## 2023-09-27 RX ORDER — AZELASTINE 1 MG/ML
1-2 SPRAY, METERED NASAL 2 TIMES DAILY PRN
Qty: 30 ML | Refills: 1 | Status: SHIPPED | OUTPATIENT
Start: 2023-09-27

## 2023-09-27 ASSESSMENT — ENCOUNTER SYMPTOMS
EYES NEGATIVE: 1
SINUS PRESSURE: 0
SHORTNESS OF BREATH: 0
SINUS PAIN: 0
RESPIRATORY NEGATIVE: 1
RHINORRHEA: 0
STRIDOR: 0

## 2023-09-27 NOTE — PROGRESS NOTES
[facility]  Dr. Olinda Trujillo. Ms.Ed        Patient Name:  Samantha Parry  :  1950     CHIEF C/O:    Chief Complaint   Patient presents with    Follow-up     Pt states on and off left ear feels clogged. States she sees the dentist in October. Stater the nose spray burns her nose        HISTORY OBTAINED FROM:  patient    HISTORY OF PRESENT ILLNESS:       Margie Charles is a 72y.o. year old female, here today for follow up of: Intermittent ear fullness and muffled hearing. Patient is less than 3 months ago and was using Flonase and nasal saline which she states was not providing any significant relief to her symptoms. She does however notice that the symptoms have become less frequent and more intermittent. She states when it occurs if she bends her head forward and back up the symptoms relief. She denies persistent noticeable hearing loss. She denies any ear pain or pressure. She denies any new dizziness. She states that she is only using the Flonase as needed due to it causing burning in the sinuses. Patient does have an appointment scheduled for October to see her dentist about possible TMJ. She has been using warm compresses and NSAID medication with some relief.            Past Medical History:   Diagnosis Date    BRCA1 negative     BRCA2 negative     Breast cancer ([address] [facility])     Cancer ([address] [facility]) 1988    left breast cancer    Vasovagal syndrome     neurocardiogenic syncope     Past Surgical History:   Procedure Laterality Date    BREAST BIOPSY Right     BREAST RECONSTRUCTION Left     MASTECTOMY Left        Current Outpatient Medications:     azelastine (ASTELIN) 0.1 % nasal spray, 1-2 sprays by Nasal route 2 times daily as needed for Rhinitis Use in each nostril as directed, Disp: 30 mL, Rfl: 1    fluticasone (FLONASE) 50 MCG/ACT nasal spray, 2 sprays by Each Nostril route daily, Disp: 48 g, Rfl: 1    sodium chloride (ALTAMIST SPRAY) 0.65 % nasal spray, 2 sprays by Nasal

## 2023-09-27 NOTE — PROGRESS NOTES
This patient was referred for tympanometric testing by GOLDY Melgar due to COME, bilaterally. Tympanometry revealed rounded tympanograms, with reduced compliance, bilaterally. Ipsilateral acoustic reflexes were present, bilaterally at 1000Hz. The results were reviewed with the patient. Recommendations for follow up will be made pending physician consult.     Juanis Valentine CCC/MAURICE  Audiologist  [id number]  NPI#:  [id number]      Electronically signed by Karla Joshua on 9/27/2023 at 7:20 AM

## 2024-01-29 ENCOUNTER — PROCEDURE VISIT (OUTPATIENT)
Dept: AUDIOLOGY | Age: 74
End: 2024-01-29
Payer: MEDICARE

## 2024-01-29 ENCOUNTER — OFFICE VISIT (OUTPATIENT)
Dept: ENT CLINIC | Age: 74
End: 2024-01-29
Payer: MEDICARE

## 2024-01-29 VITALS
HEIGHT: 64 IN | WEIGHT: 123.6 LBS | DIASTOLIC BLOOD PRESSURE: 68 MMHG | SYSTOLIC BLOOD PRESSURE: 105 MMHG | BODY MASS INDEX: 21.1 KG/M2 | HEART RATE: 59 BPM

## 2024-01-29 DIAGNOSIS — H69.93 DYSFUNCTION OF BOTH EUSTACHIAN TUBES: Primary | ICD-10-CM

## 2024-01-29 DIAGNOSIS — H93.8X3 PRESSURE SENSATION IN BOTH EARS: Primary | ICD-10-CM

## 2024-01-29 DIAGNOSIS — H73.899 DECREASED MOBILITY OF TYMPANIC MEMBRANE: ICD-10-CM

## 2024-01-29 DIAGNOSIS — H93.8X3 SENSATION OF FULLNESS IN BOTH EARS: ICD-10-CM

## 2024-01-29 PROCEDURE — 99213 OFFICE O/P EST LOW 20 MIN: CPT | Performed by: NURSE PRACTITIONER

## 2024-01-29 PROCEDURE — 1123F ACP DISCUSS/DSCN MKR DOCD: CPT | Performed by: NURSE PRACTITIONER

## 2024-01-29 PROCEDURE — 92567 TYMPANOMETRY: CPT | Performed by: AUDIOLOGIST

## 2024-01-29 ASSESSMENT — ENCOUNTER SYMPTOMS
EYES NEGATIVE: 1
SINUS PAIN: 0
SINUS PRESSURE: 0
RESPIRATORY NEGATIVE: 1
RHINORRHEA: 0
STRIDOR: 0
SHORTNESS OF BREATH: 0

## 2024-01-29 NOTE — PROGRESS NOTES
400 MG CAPS, Take 400 mg by mouth daily, Disp: , Rfl:     metoprolol succinate (TOPROL XL) 25 MG extended release tablet, Take 1 tablet by mouth daily, Disp: , Rfl:     azelastine (ASTELIN) 0.1 % nasal spray, 1-2 sprays by Nasal route 2 times daily as needed for Rhinitis Use in each nostril as directed (Patient not taking: Reported on 1/29/2024), Disp: 30 mL, Rfl: 1    fluticasone (FLONASE) 50 MCG/ACT nasal spray, 2 sprays by Each Nostril route daily (Patient not taking: Reported on 1/29/2024), Disp: 48 g, Rfl: 1  Celebrex [celecoxib]  Social History     Tobacco Use    Smoking status: Every Day     Current packs/day: 0.25     Average packs/day: 0.3 packs/day for 40.0 years (10.0 ttl pk-yrs)     Types: Cigarettes    Smokeless tobacco: Never    Tobacco comments:     patient smokes 3 cigarettes a day.  Prior heavy smoker of 1 PPD   Substance Use Topics    Alcohol use: No    Drug use: No     Family History   Problem Relation Age of Onset    Heart Disease Father     Prostate Cancer Father 70    Breast Cancer Paternal Cousin 60    Lung Cancer Brother         metastatic       Review of Systems   Constitutional: Negative.  Negative for activity change and appetite change.   HENT:  Negative for congestion, ear pain, rhinorrhea, sinus pressure, sinus pain, sneezing and tinnitus.    Eyes: Negative.    Respiratory: Negative.  Negative for shortness of breath and stridor.    Cardiovascular: Negative.  Negative for chest pain and palpitations.   Endocrine: Negative.    Musculoskeletal: Negative.    Skin: Negative.    Neurological: Negative.  Negative for dizziness.   Hematological: Negative.    Psychiatric/Behavioral: Negative.         /68 (Site: Left Upper Arm, Position: Sitting, Cuff Size: Medium Adult)   Pulse 59   Ht 1.626 m (5' 4.02\")   Wt 56.1 kg (123 lb 9.6 oz)   BMI 21.21 kg/m²   Physical Exam  Constitutional:       Appearance: Normal appearance.   HENT:      Head: Normocephalic.      Right Ear: Ear canal and

## 2024-01-29 NOTE — PROGRESS NOTES
This patient was referred for tympanometric testing by GOLDY Chino.  Patient is being seen for a 3-month tymp check, for ear pain/pressure.  Patient stated her symptoms have significantly improved.      Tympanometry revealed normal middle ear peak pressure and compliance, bilaterally.    The results were reviewed with the patient.     Recommendations for follow up will be made pending physician consult.    Bryan Rosales CCC/MAURICE  Audiologist  [id number]  NPI#:  [id number]      Electronically signed by Karla Diaz on 1/29/2024 at 8:37 AM

## 2024-07-09 NOTE — PROGRESS NOTES
disease on the left; no evidence of disease on the right.  All imaging has been negative to date.   Plan to see in 1 year with right screening mammogram same day and as needed.  She was reminded on the importance of breast self-awareness and calling in the event she would notice any changes.  All questions were answered to her apparent satisfaction.    07/25/2024 clinical follow-up: Is without secondary evidence of recurrent disease on the left.  No evidence of disease on the right breast.  She has regained her full range of motion of the bilateral upper extremities.  We again discussed the importance of breast self-exam routinely and calling in the event she would notice any change.  She continues to lead a healthy and active lifestyle.  We will plan to see her in 1 year with repeat screening mammogram same day and as needed.        02/20/2019 Genetic testing with [employer] gene test assay: Normal, no mutations.    Plan:   Continue monthly breast/chest wall self examination; detailed instructions reviewed today. Bring any changes to your physician's attention.  Continue healthy diet and exercise routinely as tolerated.    Avoid alcohol.    Stop smoking encouraged.  Repeat mammogram 1 year.  Continue follow up with Primary Care/Gynecology and all specialties as directed.  RTC 1 year with mammogram same day and PRN      I spent a total of 20 minutes on the date of the service which included preparing to see the patient, face-to-face patient care, completing clinical documentation, obtaining and/or reviewing separately obtained history, performing a medically appropriate examination, counseling and educating the patient/family/caregiver, ordering medications, tests, or procedures, communicating with other HCPs (not separately reported), independently interpreting results (not separately reported), communicating results to the patient/family/caregiver and care coordination (not separately reported).       Patti

## 2024-07-24 DIAGNOSIS — Z12.31 ENCOUNTER FOR SCREENING MAMMOGRAM FOR BREAST CANCER: Primary | ICD-10-CM

## 2024-07-25 ENCOUNTER — OFFICE VISIT (OUTPATIENT)
Dept: BREAST CENTER | Age: 74
End: 2024-07-25
Payer: MEDICARE

## 2024-07-25 ENCOUNTER — HOSPITAL ENCOUNTER (OUTPATIENT)
Dept: GENERAL RADIOLOGY | Age: 74
Discharge: HOME | End: 2024-07-27
Payer: MEDICARE

## 2024-07-25 VITALS
BODY MASS INDEX: 20.32 KG/M2 | OXYGEN SATURATION: 97 % | HEART RATE: 57 BPM | DIASTOLIC BLOOD PRESSURE: 68 MMHG | TEMPERATURE: 97.7 F | HEIGHT: 64 IN | SYSTOLIC BLOOD PRESSURE: 118 MMHG | WEIGHT: 119 LBS | RESPIRATION RATE: 16 BRPM

## 2024-07-25 VITALS — HEIGHT: 64 IN | WEIGHT: 115 LBS | BODY MASS INDEX: 19.63 KG/M2

## 2024-07-25 DIAGNOSIS — Z12.31 ENCOUNTER FOR SCREENING MAMMOGRAM FOR BREAST CANCER: ICD-10-CM

## 2024-07-25 DIAGNOSIS — Z85.3 PERSONAL HISTORY OF BREAST CANCER: Primary | ICD-10-CM

## 2024-07-25 PROBLEM — G89.11 ACUTE PAIN OF LEFT SHOULDER DUE TO TRAUMA: Status: RESOLVED | Noted: 2022-07-05 | Resolved: 2024-07-25

## 2024-07-25 PROBLEM — M25.512 ACUTE PAIN OF LEFT SHOULDER DUE TO TRAUMA: Status: RESOLVED | Noted: 2022-07-05 | Resolved: 2024-07-25

## 2024-07-25 PROCEDURE — 77063 BREAST TOMOSYNTHESIS BI: CPT

## 2024-07-25 PROCEDURE — 99213 OFFICE O/P EST LOW 20 MIN: CPT | Performed by: NURSE PRACTITIONER

## 2024-07-25 PROCEDURE — 1123F ACP DISCUSS/DSCN MKR DOCD: CPT | Performed by: NURSE PRACTITIONER

## 2024-07-25 ASSESSMENT — ENCOUNTER SYMPTOMS
RESPIRATORY NEGATIVE: 1
GASTROINTESTINAL NEGATIVE: 1

## 2025-05-22 DIAGNOSIS — Z12.31 ENCOUNTER FOR SCREENING MAMMOGRAM FOR BREAST CANCER: Primary | ICD-10-CM

## 2025-07-18 NOTE — PROGRESS NOTES
Summary:  Hx of left modified radical mastectomy for stage 0 or at most stage I breast cancer at age 37.  No adjuvant therapy, (Endocrine, medical, or radiation).   This note was copied forward from the last encounter.  Essential components for this patient record were reviewed and verified on this visit including:  recent hospitalizations, recent imaging, PMH, PSH, FH, SOC HX, Allergies, and Medications were reviewed and updated as appropriate.  In addition, the assessment and plan were copied from prior office note and updated accordingly.      Patient ID: Sylvia Calderon is a 75 y.o. female.      HPI  PCP:  HALI MEADOWS Gynecologist: BRITTANY CUTLER     75 y.o. pleasant female presents for follow up today for her history of premenopausal, stage 0-1 Left breast cancer.           Surgical pathology, 11/21/1988      Patient does not recall breast marker studies being done.     9/29/92: Right breast lump - Fine needle aspiration biopsy: scant cellular nondiagnostic aspirate insufficient for further evaluation  10/2/92: Excision of right breast mass ~ Pathology: Dense fibrosis and cystic dilation of ductal structures, some of which contain apocrine metaplasia. No evidence of atypia, hyperplasia, malignancy or other pathologic processes are identified. The findings are consistent with fibrocystic change.    01/19/2005:  Left implant removal due to discomfort: Left axillary lymph node excision due to lump: Pathologic evaluation at [facility] was negative for malignancy.  06/14/17, Right screening mammogram, [facility]:  Negative, BI-RADS 1  06/25/19 [facility]: No mammographic evidence of malignancy.    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        Sylvia continues to do well.  She has no breast/chest wall related complaints.  Anticipating the

## 2025-07-31 ENCOUNTER — HOSPITAL ENCOUNTER (OUTPATIENT)
Dept: GENERAL RADIOLOGY | Age: 75
Discharge: HOME | End: 2025-08-02
Payer: MEDICARE

## 2025-07-31 ENCOUNTER — OFFICE VISIT (OUTPATIENT)
Age: 75
End: 2025-07-31

## 2025-07-31 VITALS
BODY MASS INDEX: 19.12 KG/M2 | TEMPERATURE: 97.3 F | OXYGEN SATURATION: 99 % | HEIGHT: 64 IN | DIASTOLIC BLOOD PRESSURE: 58 MMHG | HEART RATE: 48 BPM | SYSTOLIC BLOOD PRESSURE: 116 MMHG | WEIGHT: 112 LBS

## 2025-07-31 VITALS — BODY MASS INDEX: 20.49 KG/M2 | WEIGHT: 120 LBS | HEIGHT: 64 IN

## 2025-07-31 DIAGNOSIS — Z85.3 PERSONAL HISTORY OF BREAST CANCER: Primary | ICD-10-CM

## 2025-07-31 DIAGNOSIS — Z12.31 ENCOUNTER FOR SCREENING MAMMOGRAM FOR BREAST CANCER: ICD-10-CM

## 2025-07-31 PROCEDURE — 77063 BREAST TOMOSYNTHESIS BI: CPT

## 2025-07-31 RX ORDER — CYCLOSPORINE 0.5 MG/ML
1 EMULSION OPHTHALMIC EVERY 12 HOURS
COMMUNITY
Start: 2025-01-05

## 2025-07-31 ASSESSMENT — ENCOUNTER SYMPTOMS
CHEST TIGHTNESS: 0
APNEA: 0
WHEEZING: 0
STRIDOR: 0